# Patient Record
Sex: MALE | Race: WHITE | NOT HISPANIC OR LATINO | Employment: OTHER | URBAN - METROPOLITAN AREA
[De-identification: names, ages, dates, MRNs, and addresses within clinical notes are randomized per-mention and may not be internally consistent; named-entity substitution may affect disease eponyms.]

---

## 2018-01-16 ENCOUNTER — OFFICE VISIT (OUTPATIENT)
Dept: CARDIOLOGY | Facility: CLINIC | Age: 78
End: 2018-01-16
Attending: INTERNAL MEDICINE
Payer: MEDICARE

## 2018-01-16 ENCOUNTER — CLINICAL SUPPORT (OUTPATIENT)
Dept: CARDIOLOGY | Facility: CLINIC | Age: 78
End: 2018-01-16
Payer: MEDICARE

## 2018-01-16 VITALS
HEIGHT: 70 IN | DIASTOLIC BLOOD PRESSURE: 90 MMHG | HEART RATE: 80 BPM | SYSTOLIC BLOOD PRESSURE: 136 MMHG | WEIGHT: 200 LBS | BODY MASS INDEX: 28.63 KG/M2

## 2018-01-16 DIAGNOSIS — I35.1 NONRHEUMATIC AORTIC VALVE INSUFFICIENCY: Primary | ICD-10-CM

## 2018-01-16 DIAGNOSIS — I25.10 CORONARY ARTERY DISEASE INVOLVING NATIVE CORONARY ARTERY OF NATIVE HEART WITHOUT ANGINA PECTORIS: ICD-10-CM

## 2018-01-16 DIAGNOSIS — I10 ESSENTIAL HYPERTENSION: Primary | ICD-10-CM

## 2018-01-16 DIAGNOSIS — I35.1 NONRHEUMATIC AORTIC VALVE INSUFFICIENCY: ICD-10-CM

## 2018-01-16 DIAGNOSIS — I10 ESSENTIAL HYPERTENSION: ICD-10-CM

## 2018-01-16 DIAGNOSIS — I35.0 NONRHEUMATIC AORTIC VALVE STENOSIS: ICD-10-CM

## 2018-01-16 DIAGNOSIS — I35.1 NONRHEUMATIC AORTIC (VALVE) INSUFFICIENCY: ICD-10-CM

## 2018-01-16 PROCEDURE — 99213 OFFICE O/P EST LOW 20 MIN: CPT | Mod: S$GLB,,, | Performed by: INTERNAL MEDICINE

## 2018-01-16 PROCEDURE — 93306 TTE W/DOPPLER COMPLETE: CPT | Mod: S$GLB,,, | Performed by: INTERNAL MEDICINE

## 2018-01-19 PROBLEM — I35.0 NONRHEUMATIC AORTIC VALVE STENOSIS: Status: ACTIVE | Noted: 2018-01-19

## 2018-01-19 NOTE — PROGRESS NOTES
Subjective:    Patient ID:  Miguel Sawant Jr. is a 77 y.o. male     HPI  Here for F/U of Aortic valve disease, HBP    I feel great. Active. Recent trip to [place].    Current Outpatient Prescriptions   Medication Sig    aspirin (ECOTRIN) 81 MG EC tablet Take 81 mg by mouth once daily.      atorvastatin (LIPITOR) 20 MG tablet TAKE 1 TABLET DAILY    glucosamine-chondroitin (COSAMIN DS) 500-400 mg tablet Take 1 tablet by mouth 3 (three) times daily.      hydrochlorothiazide (HYDRODIURIL) 25 MG tablet TAKE 1 TABLET THREE TIMES A WEEK WITH ORANGE JUICE    hydrocodone-acetaminophen 5-325mg (NORCO) 5-325 mg per tablet Take 1 tablet by mouth every 4 (four) hours as needed for Pain.    MULTIVITAMIN W-MINERALS/LUTEIN (CENTRUM SILVER ORAL) Take by mouth once daily.      ondansetron (ZOFRAN-ODT) 4 MG TbDL Take 2 tablets (8 mg total) by mouth every 8 (eight) hours as needed.    oxycodone-acetaminophen (PERCOCET)  mg per tablet Take 1 tablet by mouth every 4 to 6 hours as needed.    ramipril (ALTACE) 10 MG capsule TAKE 1 CAPSULE DAILY    TOPROL XL 25 mg 24 hr tablet TAKE 1 TABLET DAILY     No current facility-administered medications for this visit.          Review of Systems   Constitution: Negative for chills, decreased appetite, fever, weight gain and weight loss.   HENT: Negative for congestion, hearing loss and sore throat.    Eyes: Negative for blurred vision, double vision and visual disturbance.   Cardiovascular: Negative for chest pain, claudication, dyspnea on exertion, leg swelling, palpitations and syncope.   Respiratory: Negative for cough, hemoptysis, shortness of breath, sputum production and wheezing.    Endocrine: Negative for cold intolerance and heat intolerance.   Hematologic/Lymphatic: Negative for bleeding problem. Does not bruise/bleed easily.   Skin: Negative for color change, dry skin, flushing and itching.   Musculoskeletal: Negative for back pain, joint pain and myalgias.  "  Gastrointestinal: Negative for abdominal pain, anorexia, constipation, diarrhea, dysphagia, nausea and vomiting.        No bleeding per rectum   Genitourinary: Negative for dysuria, flank pain, frequency, hematuria and nocturia.   Neurological: Negative for dizziness, headaches, light-headedness, loss of balance, seizures and tremors.   Psychiatric/Behavioral: Negative for altered mental status and depression.         Vitals:    01/16/18 1318   BP: (!) 136/90   Pulse: 80   Weight: 90.7 kg (200 lb)   Height: 5' 10" (1.778 m)     Objective:    Physical Exam   Constitutional: He is oriented to person, place, and time. He appears well-developed and well-nourished.   HENT:   Head: Normocephalic and atraumatic.   Right Ear: External ear normal.   Left Ear: External ear normal.   Nose: Nose normal.   Eyes: Conjunctivae and EOM are normal. Pupils are equal, round, and reactive to light. No scleral icterus.   Neck: Normal range of motion. Neck supple. No JVD present. No tracheal deviation present. No thyromegaly present.   Cardiovascular: Normal rate and regular rhythm.  Exam reveals no gallop and no friction rub.    Murmur heard.  Basal ejection systolic murmur conducted to the carotids.   Pulmonary/Chest: Effort normal and breath sounds normal. No respiratory distress. He has no rales. He exhibits no tenderness.   Abdominal: Soft. Bowel sounds are normal. He exhibits no distension and no mass. There is no tenderness.   Musculoskeletal: Normal range of motion. He exhibits no edema or tenderness.   Lymphadenopathy:     He has no cervical adenopathy.   Neurological: He is alert and oriented to person, place, and time. He has normal reflexes. No cranial nerve deficit. Coordination normal.   Skin: Skin is warm and dry. No rash noted.   Psychiatric: He has a normal mood and affect. His behavior is normal.       Reviewed Echo    Assessment:       1. Nonrheumatic aortic valve insufficiency    2. Nonrheumatic aortic valve " stenosis    3. Essential hypertension    4. Coronary artery disease involving native coronary artery of native heart without angina pectoris         Plan:       Stable  Discussed Echo findings with patient.  Continue the same meds.

## 2018-01-22 RX ORDER — ATORVASTATIN CALCIUM 20 MG/1
TABLET, FILM COATED ORAL
Qty: 90 TABLET | Refills: 3 | Status: SHIPPED | OUTPATIENT
Start: 2018-01-22 | End: 2019-01-20 | Stop reason: SDUPTHER

## 2018-02-26 RX ORDER — HYDROCHLOROTHIAZIDE 25 MG/1
TABLET ORAL
Qty: 90 TABLET | Refills: 1 | Status: SHIPPED | OUTPATIENT
Start: 2018-02-26 | End: 2019-03-18 | Stop reason: SDUPTHER

## 2018-05-15 ENCOUNTER — OFFICE VISIT (OUTPATIENT)
Dept: CARDIOLOGY | Facility: CLINIC | Age: 78
End: 2018-05-15
Attending: INTERNAL MEDICINE
Payer: MEDICARE

## 2018-05-15 VITALS
DIASTOLIC BLOOD PRESSURE: 88 MMHG | BODY MASS INDEX: 28.49 KG/M2 | SYSTOLIC BLOOD PRESSURE: 136 MMHG | HEART RATE: 64 BPM | WEIGHT: 199 LBS | HEIGHT: 70 IN

## 2018-05-15 DIAGNOSIS — I35.1 NONRHEUMATIC AORTIC VALVE INSUFFICIENCY: ICD-10-CM

## 2018-05-15 DIAGNOSIS — I35.0 NONRHEUMATIC AORTIC VALVE STENOSIS: ICD-10-CM

## 2018-05-15 DIAGNOSIS — I25.10 CORONARY ARTERY DISEASE INVOLVING NATIVE CORONARY ARTERY OF NATIVE HEART WITHOUT ANGINA PECTORIS: ICD-10-CM

## 2018-05-15 DIAGNOSIS — I10 ESSENTIAL HYPERTENSION: Primary | ICD-10-CM

## 2018-05-15 PROCEDURE — 99213 OFFICE O/P EST LOW 20 MIN: CPT | Mod: S$GLB,,, | Performed by: INTERNAL MEDICINE

## 2018-05-15 NOTE — PROGRESS NOTES
Subjective:    Patient ID:  Miguel Sawant Jr. is a 78 y.o. male     HPI here for follow-up of essential hypertension, aortic stenosis with aortic incompetence, and coronary artery disease.    I feel well.  I go to the gym regularly, and [place]    Current Outpatient Prescriptions   Medication Sig    aspirin (ECOTRIN) 81 MG EC tablet Take 81 mg by mouth once daily.      atorvastatin (LIPITOR) 20 MG tablet TAKE 1 TABLET DAILY    glucosamine-chondroitin (COSAMIN DS) 500-400 mg tablet Take 1 tablet by mouth 3 (three) times daily.      hydroCHLOROthiazide (HYDRODIURIL) 25 MG tablet TAKE 1 TABLET THREE TIMES A WEEK WITH ORANGE JUICE    hydrocodone-acetaminophen 5-325mg (NORCO) 5-325 mg per tablet Take 1 tablet by mouth every 4 (four) hours as needed for Pain.    MULTIVITAMIN W-MINERALS/LUTEIN (CENTRUM SILVER ORAL) Take by mouth once daily.      ondansetron (ZOFRAN-ODT) 4 MG TbDL Take 2 tablets (8 mg total) by mouth every 8 (eight) hours as needed.    oxycodone-acetaminophen (PERCOCET)  mg per tablet Take 1 tablet by mouth every 4 to 6 hours as needed.    ramipril (ALTACE) 10 MG capsule TAKE 1 CAPSULE DAILY    TOPROL XL 25 mg 24 hr tablet TAKE 1 TABLET DAILY     No current facility-administered medications for this visit.          Review of Systems   Constitution: Negative for chills, decreased appetite, fever, weight gain and weight loss.   HENT: Negative for congestion, hearing loss and sore throat.    Eyes: Negative for blurred vision, double vision and visual disturbance.   Cardiovascular: Negative for chest pain, claudication, dyspnea on exertion, leg swelling, palpitations and syncope.   Respiratory: Negative for cough, hemoptysis, shortness of breath, sputum production and wheezing.    Endocrine: Negative for cold intolerance and heat intolerance.   Hematologic/Lymphatic: Negative for bleeding problem. Does not bruise/bleed easily.   Skin: Negative for color change, dry  "skin, flushing and itching.   Musculoskeletal: Negative for back pain, joint pain and myalgias.   Gastrointestinal: Negative for abdominal pain, anorexia, constipation, diarrhea, dysphagia, nausea and vomiting.        No bleeding per rectum   Genitourinary: Negative for dysuria, flank pain, frequency, hematuria and nocturia.   Neurological: Negative for dizziness, headaches, light-headedness, loss of balance, seizures and tremors.   Psychiatric/Behavioral: Negative for altered mental status and depression.         Vitals:    05/15/18 1441   BP: 136/88   Pulse: 64   Weight: 90.3 kg (199 lb)   Height: 5' 10" (1.778 m)     Objective:    Physical Exam   Constitutional: He is oriented to person, place, and time. He appears well-developed and well-nourished.   HENT:   Head: Normocephalic and atraumatic.   Right Ear: External ear normal.   Left Ear: External ear normal.   Nose: Nose normal.   Eyes: Conjunctivae and EOM are normal. Pupils are equal, round, and reactive to light. No scleral icterus.   Neck: Normal range of motion. Neck supple. No JVD present. No tracheal deviation present. No thyromegaly present.   Cardiovascular: Normal rate and regular rhythm.  Exam reveals no gallop and no friction rub.    Murmur heard.  Basal ejection systolic murmur.  Early diastolic murmur.   Pulmonary/Chest: Effort normal and breath sounds normal. No respiratory distress. He has no rales. He exhibits no tenderness.   Abdominal: Soft. Bowel sounds are normal. He exhibits no distension and no mass. There is no tenderness.   Musculoskeletal: Normal range of motion. He exhibits no edema or tenderness.   Lymphadenopathy:     He has no cervical adenopathy.   Neurological: He is alert and oriented to person, place, and time. He has normal reflexes. No cranial nerve deficit. Coordination normal.   Skin: Skin is warm and dry. No rash noted.   Psychiatric: He has a normal mood and affect. His behavior is normal.         Assessment:       1. " Essential hypertension    2. Nonrheumatic aortic valve insufficiency    3. Coronary artery disease involving native coronary artery of native heart without angina pectoris    4. Nonrheumatic aortic valve stenosis         Plan:       Stable.    Continue the same regimen of medications.

## 2018-09-17 RX ORDER — METOPROLOL SUCCINATE 25 MG/1
TABLET, EXTENDED RELEASE ORAL
Qty: 90 TABLET | Refills: 3 | Status: SHIPPED | OUTPATIENT
Start: 2018-09-17 | End: 2019-08-22 | Stop reason: SDUPTHER

## 2018-09-17 RX ORDER — RAMIPRIL 10 MG/1
CAPSULE ORAL
Qty: 90 CAPSULE | Refills: 3 | Status: SHIPPED | OUTPATIENT
Start: 2018-09-17 | End: 2019-08-22 | Stop reason: SDUPTHER

## 2019-01-20 DIAGNOSIS — I25.10 CORONARY ARTERY DISEASE INVOLVING NATIVE CORONARY ARTERY OF NATIVE HEART WITHOUT ANGINA PECTORIS: Primary | ICD-10-CM

## 2019-01-21 RX ORDER — ATORVASTATIN CALCIUM 20 MG/1
TABLET, FILM COATED ORAL
Qty: 90 TABLET | Refills: 3 | Status: SHIPPED | OUTPATIENT
Start: 2019-01-21 | End: 2020-01-16

## 2019-03-18 DIAGNOSIS — I10 ESSENTIAL HYPERTENSION: Primary | ICD-10-CM

## 2019-03-18 RX ORDER — HYDROCHLOROTHIAZIDE 25 MG/1
TABLET ORAL
Qty: 90 TABLET | Refills: 1 | Status: SHIPPED | OUTPATIENT
Start: 2019-03-18 | End: 2020-02-17

## 2019-08-22 DIAGNOSIS — I10 ESSENTIAL HYPERTENSION: Primary | ICD-10-CM

## 2019-08-22 RX ORDER — METOPROLOL SUCCINATE 25 MG/1
TABLET, EXTENDED RELEASE ORAL
Qty: 90 TABLET | Refills: 4 | Status: SHIPPED | OUTPATIENT
Start: 2019-08-22 | End: 2020-10-27

## 2019-08-22 RX ORDER — RAMIPRIL 10 MG/1
CAPSULE ORAL
Qty: 90 CAPSULE | Refills: 4 | Status: SHIPPED | OUTPATIENT
Start: 2019-08-22 | End: 2020-10-27

## 2019-10-09 ENCOUNTER — OFFICE VISIT (OUTPATIENT)
Dept: CARDIOLOGY | Facility: CLINIC | Age: 79
End: 2019-10-09
Attending: INTERNAL MEDICINE
Payer: MEDICARE

## 2019-10-09 VITALS
HEART RATE: 79 BPM | DIASTOLIC BLOOD PRESSURE: 90 MMHG | HEIGHT: 70 IN | WEIGHT: 203 LBS | SYSTOLIC BLOOD PRESSURE: 139 MMHG | BODY MASS INDEX: 29.06 KG/M2

## 2019-10-09 DIAGNOSIS — I35.1 NONRHEUMATIC AORTIC VALVE INSUFFICIENCY: ICD-10-CM

## 2019-10-09 DIAGNOSIS — I35.1 NONRHEUMATIC AORTIC VALVE INSUFFICIENCY: Primary | ICD-10-CM

## 2019-10-09 DIAGNOSIS — I10 ESSENTIAL HYPERTENSION: Primary | ICD-10-CM

## 2019-10-09 DIAGNOSIS — I35.0 NONRHEUMATIC AORTIC VALVE STENOSIS: ICD-10-CM

## 2019-10-09 DIAGNOSIS — I25.10 CORONARY ARTERY DISEASE INVOLVING NATIVE CORONARY ARTERY OF NATIVE HEART WITHOUT ANGINA PECTORIS: ICD-10-CM

## 2019-10-09 PROCEDURE — 99213 OFFICE O/P EST LOW 20 MIN: CPT | Mod: S$GLB,,, | Performed by: INTERNAL MEDICINE

## 2019-10-09 PROCEDURE — 99213 PR OFFICE/OUTPT VISIT, EST, LEVL III, 20-29 MIN: ICD-10-PCS | Mod: S$GLB,,, | Performed by: INTERNAL MEDICINE

## 2019-10-25 NOTE — PROGRESS NOTES
Subjective:    Patient ID:  Miguel Sawant Jr. is a 79 y.o. male     HPI   Here for follow-up of hypertension, aortic valve disease, non flow restrictive coronary artery disease.    I feel well, I work out pretty regularly, and I have no complaints.    Current Outpatient Medications   Medication Sig    aspirin (ECOTRIN) 81 MG EC tablet Take 81 mg by mouth once daily.      atorvastatin (LIPITOR) 20 MG tablet TAKE 1 TABLET DAILY    glucosamine-chondroitin (COSAMIN DS) 500-400 mg tablet Take 1 tablet by mouth 3 (three) times daily.      hydroCHLOROthiazide (HYDRODIURIL) 25 MG tablet TAKE 1 TABLET THREE TIMES A WEEK WITH ORANGE JUICE    hydrocodone-acetaminophen 5-325mg (NORCO) 5-325 mg per tablet Take 1 tablet by mouth every 4 (four) hours as needed for Pain.    MULTIVITAMIN W-MINERALS/LUTEIN (CENTRUM SILVER ORAL) Take by mouth once daily.      ondansetron (ZOFRAN-ODT) 4 MG TbDL Take 2 tablets (8 mg total) by mouth every 8 (eight) hours as needed.    oxycodone-acetaminophen (PERCOCET)  mg per tablet Take 1 tablet by mouth every 4 to 6 hours as needed.    ramipril (ALTACE) 10 MG capsule TAKE 1 CAPSULE DAILY    TOPROL XL 25 mg 24 hr tablet TAKE 1 TABLET DAILY     No current facility-administered medications for this visit.          Review of Systems   Constitution: Negative for chills, decreased appetite, fever, weight gain and weight loss.   HENT: Negative for congestion, hearing loss and sore throat.    Eyes: Negative for blurred vision, double vision and visual disturbance.   Cardiovascular: Negative for chest pain, claudication, dyspnea on exertion, leg swelling, palpitations and syncope.   Respiratory: Negative for cough, hemoptysis, shortness of breath, sputum production and wheezing.    Endocrine: Negative for cold intolerance and heat intolerance.   Hematologic/Lymphatic: Negative for bleeding problem. Does not bruise/bleed easily.   Skin: Negative for color change, dry skin, flushing and  "itching.   Musculoskeletal: Negative for back pain, joint pain and myalgias.   Gastrointestinal: Negative for abdominal pain, anorexia, constipation, diarrhea, dysphagia, nausea and vomiting.        No bleeding per rectum   Genitourinary: Negative for dysuria, flank pain, frequency, hematuria and nocturia.   Neurological: Negative for dizziness, headaches, light-headedness, loss of balance, seizures and tremors.   Psychiatric/Behavioral: Negative for altered mental status and depression.         Vitals:    10/09/19 1412   BP: (!) 139/90   Pulse: 79   Weight: 92.1 kg (203 lb)   Height: 5' 10" (1.778 m)    Blood pressure recheck 132/76.    Objective:    Physical Exam   Constitutional: He is oriented to person, place, and time. He appears well-developed and well-nourished.   HENT:   Head: Normocephalic and atraumatic.   Right Ear: External ear normal.   Left Ear: External ear normal.   Nose: Nose normal.   Eyes: Pupils are equal, round, and reactive to light. Conjunctivae and EOM are normal. No scleral icterus.   Neck: Normal range of motion. Neck supple. No JVD present. No tracheal deviation present. No thyromegaly present.   Cardiovascular: Normal rate and regular rhythm. Exam reveals no gallop and no friction rub.   Murmur heard.  2/6 basal ejection systolic murmur conducted to the carotids.  Soft early diastolic murmur.   Pulmonary/Chest: Effort normal and breath sounds normal. No respiratory distress. He has no rales. He exhibits no tenderness.   Abdominal: Soft. Bowel sounds are normal. He exhibits no distension and no mass. There is no tenderness.   Musculoskeletal: Normal range of motion. He exhibits no edema or tenderness.   Lymphadenopathy:     He has no cervical adenopathy.   Neurological: He is alert and oriented to person, place, and time. He has normal reflexes. No cranial nerve deficit. Coordination normal.   Skin: Skin is warm and dry. No rash noted.   Psychiatric: He has a normal mood and affect. His " behavior is normal.         Assessment:       1. Essential hypertension    2. Nonrheumatic aortic valve insufficiency    3. Nonrheumatic aortic valve stenosis    4. Coronary artery disease involving native coronary artery of native heart without angina pectoris         Plan:         Stable.  Continue present pharmacological regimen.

## 2020-01-16 DIAGNOSIS — I25.10 CORONARY ARTERY DISEASE INVOLVING NATIVE CORONARY ARTERY OF NATIVE HEART WITHOUT ANGINA PECTORIS: ICD-10-CM

## 2020-01-16 RX ORDER — ATORVASTATIN CALCIUM 20 MG/1
TABLET, FILM COATED ORAL
Qty: 90 TABLET | Refills: 4 | Status: SHIPPED | OUTPATIENT
Start: 2020-01-16

## 2020-02-17 DIAGNOSIS — I10 ESSENTIAL HYPERTENSION: ICD-10-CM

## 2020-02-17 RX ORDER — HYDROCHLOROTHIAZIDE 25 MG/1
TABLET ORAL
Qty: 90 TABLET | Refills: 4 | Status: SHIPPED | OUTPATIENT
Start: 2020-02-17 | End: 2023-08-25

## 2020-04-21 ENCOUNTER — CLINICAL SUPPORT (OUTPATIENT)
Dept: CARDIOLOGY | Facility: CLINIC | Age: 80
End: 2020-04-21
Attending: INTERNAL MEDICINE
Payer: MEDICARE

## 2020-04-21 VITALS
DIASTOLIC BLOOD PRESSURE: 100 MMHG | WEIGHT: 206 LBS | BODY MASS INDEX: 29.49 KG/M2 | HEIGHT: 70 IN | SYSTOLIC BLOOD PRESSURE: 168 MMHG | HEART RATE: 62 BPM

## 2020-04-21 DIAGNOSIS — I35.1 NONRHEUMATIC AORTIC VALVE INSUFFICIENCY: ICD-10-CM

## 2020-04-21 DIAGNOSIS — I35.0 NONRHEUMATIC AORTIC VALVE STENOSIS: Primary | ICD-10-CM

## 2020-04-21 DIAGNOSIS — I25.10 CORONARY ARTERY DISEASE INVOLVING NATIVE CORONARY ARTERY OF NATIVE HEART WITHOUT ANGINA PECTORIS: ICD-10-CM

## 2020-04-21 DIAGNOSIS — I10 ESSENTIAL HYPERTENSION: ICD-10-CM

## 2020-04-21 LAB
AORTIC ROOT ANNULUS: 3.24 CM
AORTIC VALVE CUSP SEPERATION: 1.52 CM
AV INDEX (PROSTH): 0.3
AV MEAN GRADIENT: 28 MMHG
AV PEAK GRADIENT: 43 MMHG
AV REGURGITATION PRESSURE HALF TIME: 613 MS
AV VALVE AREA: 1.29 CM2
AV VELOCITY RATIO: 0.28
CV ECHO LV RWT: 0.57 CM
DOP CALC AO PEAK VEL: 3.27 M/S
DOP CALC AO VTI: 78.81 CM
DOP CALC LVOT AREA: 4.3 CM2
DOP CALC LVOT DIAMETER: 2.33 CM
DOP CALC LVOT PEAK VEL: 0.91 M/S
DOP CALC LVOT STROKE VOLUME: 101.68 CM3
DOP CALCLVOT PEAK VEL VTI: 23.86 CM
E WAVE DECELERATION TIME: 229.64 MSEC
E/A RATIO: 0.68
ECHO EF ESTIMATED: 65 %
ECHO LV POSTERIOR WALL: 1.31 CM (ref 0.6–1.1)
FRACTIONAL SHORTENING: 36 % (ref 28–44)
INTERVENTRICULAR SEPTUM: 1.31 CM (ref 0.6–1.1)
IVC PROX: 1.6 CM
IVRT: 114.18 MSEC
LEFT ATRIUM SIZE: 4.77 CM
LEFT INTERNAL DIMENSION IN SYSTOLE: 2.94 CM (ref 2.1–4)
LEFT VENTRICLE DIASTOLIC VOLUME: 95.48 ML
LEFT VENTRICLE SYSTOLIC VOLUME: 33.24 ML
LEFT VENTRICULAR INTERNAL DIMENSION IN DIASTOLE: 4.56 CM (ref 3.5–6)
LEFT VENTRICULAR MASS: 229.67 G
LV LATERAL E/E' RATIO: 0.05 M/S
MV A" WAVE DURATION": 198 MSEC
MV PEAK A VEL: 0.79 M/S
MV PEAK E VEL: 0.54 M/S
PISA TR MAX VEL: 2.92 M/S
PULM VEIN A" WAVE DURATION": 110 MSEC
PULM VEIN S/D RATIO: 1.23
PV PEAK D VEL: 0.39 M/S
PV PEAK S VEL: 0.48 M/S
PV PEAK VELOCITY: 1.12 CM/S
RA PRESSURE: 3 MMHG
RIGHT VENTRICULAR END-DIASTOLIC DIMENSION: 3.62 CM
TDI LATERAL: 12 M/S
TR MAX PG: 34 MMHG
TRICUSPID ANNULAR PLANE SYSTOLIC EXCURSION: 1.99 CM
TRICUSPID VALVE PEAK A WAVE VELOCITY: 0.73 M/S
TV PEAK E VEL: 0.67 M/S
TV REST PULMONARY ARTERY PRESSURE: 37 MMHG

## 2020-04-21 PROCEDURE — 99214 OFFICE O/P EST MOD 30 MIN: CPT | Mod: S$GLB,,, | Performed by: INTERNAL MEDICINE

## 2020-04-21 PROCEDURE — 99214 PR OFFICE/OUTPT VISIT, EST, LEVL IV, 30-39 MIN: ICD-10-PCS | Mod: S$GLB,,, | Performed by: INTERNAL MEDICINE

## 2020-04-21 NOTE — PROGRESS NOTES
Subjective:    Patient ID:  Miguel Sawant Jr. is a 80 y.o. male     HPI  Here for follow-up of hypertension, aortic valve disease,(last echocardiogram in January 2018 showed an aortic valve sq area of 1.5 sq cm.),  non flow restrictive coronary artery disease.    I feel well.  [place] yd work.    Current Outpatient Medications   Medication Sig    aspirin (ECOTRIN) 81 MG EC tablet Take 81 mg by mouth once daily.      atorvastatin (LIPITOR) 20 MG tablet TAKE 1 TABLET DAILY    glucosamine-chondroitin (COSAMIN DS) 500-400 mg tablet Take 1 tablet by mouth 3 (three) times daily.      hydroCHLOROthiazide (HYDRODIURIL) 25 MG tablet TAKE 1 TABLET THREE TIMES A WEEK WITH ORANGE JUICE    hydrocodone-acetaminophen 5-325mg (NORCO) 5-325 mg per tablet Take 1 tablet by mouth every 4 (four) hours as needed for Pain.    MULTIVITAMIN W-MINERALS/LUTEIN (CENTRUM SILVER ORAL) Take by mouth once daily.      ondansetron (ZOFRAN-ODT) 4 MG TbDL Take 2 tablets (8 mg total) by mouth every 8 (eight) hours as needed.    oxycodone-acetaminophen (PERCOCET)  mg per tablet Take 1 tablet by mouth every 4 to 6 hours as needed.    ramipril (ALTACE) 10 MG capsule TAKE 1 CAPSULE DAILY    TOPROL XL 25 mg 24 hr tablet TAKE 1 TABLET DAILY     No current facility-administered medications for this visit.           Review of Systems   Constitution: Negative for chills, decreased appetite, fever, weight gain and weight loss.   HENT: Negative for congestion, hearing loss and sore throat.    Eyes: Negative for blurred vision, double vision and visual disturbance.   Cardiovascular: Negative for chest pain, claudication, dyspnea on exertion, leg swelling, palpitations and syncope.   Respiratory: Negative for cough, hemoptysis, shortness of breath, sputum production and wheezing.    Endocrine: Negative for cold intolerance and heat intolerance.   Hematologic/Lymphatic: Negative for bleeding problem. Does not  "bruise/bleed easily.   Skin: Negative for color change, dry skin, flushing and itching.   Musculoskeletal: Negative for back pain, joint pain and myalgias.   Gastrointestinal: Negative for abdominal pain, anorexia, constipation, diarrhea, dysphagia, nausea and vomiting.        No bleeding per rectum   Genitourinary: Negative for dysuria, flank pain, frequency, hematuria and nocturia.   Neurological: Negative for dizziness, headaches, light-headedness, loss of balance, seizures and tremors.   Psychiatric/Behavioral: Negative for altered mental status and depression.         Vitals:    04/21/20 1110   BP: (!) 168/100   Pulse: 62   Weight: 93.4 kg (206 lb)   Height: 5' 10" (1.778 m)    Blood pressure recheck 138/76.      Objective:    Physical Exam   Constitutional: He is oriented to person, place, and time. He appears well-developed and well-nourished.   HENT:   Head: Normocephalic and atraumatic.   Right Ear: External ear normal.   Left Ear: External ear normal.   Nose: Nose normal.   Eyes: Pupils are equal, round, and reactive to light. Conjunctivae and EOM are normal. No scleral icterus.   Neck: Normal range of motion. Neck supple. No JVD present. No tracheal deviation present. No thyromegaly present.   Cardiovascular: Normal rate and regular rhythm. Exam reveals no gallop and no friction rub.   Murmur heard.  Basal ejection systolic murmur conducted to the carotids.   Pulmonary/Chest: Effort normal and breath sounds normal. No respiratory distress. He has no rales. He exhibits no tenderness.   Abdominal: Soft. Bowel sounds are normal. He exhibits no distension and no mass. There is no tenderness.   Musculoskeletal: Normal range of motion. He exhibits no edema or tenderness.   Lymphadenopathy:     He has no cervical adenopathy.   Neurological: He is alert and oriented to person, place, and time. He has normal reflexes. No cranial nerve deficit. Coordination normal.   Skin: Skin is warm and dry. No rash noted. "   Psychiatric: He has a normal mood and affect. His behavior is normal.       echocardiogram reviewed.  Estimated aortic valve sq area is 1.3 sq cm.    Assessment:       1. Nonrheumatic aortic valve stenosis    2. Nonrheumatic aortic valve insufficiency    3. Essential hypertension    4. Coronary artery disease involving native coronary artery of native heart without angina pectoris         Plan:       Discussed echocardiogram findings with patient.  Continue present pharmacological regimen.  Monitor blood pressure at home.

## 2021-01-22 ENCOUNTER — PATIENT MESSAGE (OUTPATIENT)
Dept: ADMINISTRATIVE | Facility: OTHER | Age: 81
End: 2021-01-22

## 2021-02-10 ENCOUNTER — IMMUNIZATION (OUTPATIENT)
Dept: PHARMACY | Facility: CLINIC | Age: 81
End: 2021-02-10
Payer: MEDICARE

## 2021-02-10 DIAGNOSIS — Z23 NEED FOR VACCINATION: Primary | ICD-10-CM

## 2021-03-10 ENCOUNTER — IMMUNIZATION (OUTPATIENT)
Dept: PHARMACY | Facility: CLINIC | Age: 81
End: 2021-03-10
Payer: MEDICARE

## 2021-03-10 DIAGNOSIS — Z23 NEED FOR VACCINATION: Primary | ICD-10-CM

## 2021-07-26 ENCOUNTER — HOSPITAL ENCOUNTER (OUTPATIENT)
Dept: PREADMISSION TESTING | Facility: OTHER | Age: 81
Discharge: HOME | End: 2021-07-26
Attending: INTERNAL MEDICINE
Payer: MEDICARE

## 2021-07-26 VITALS
SYSTOLIC BLOOD PRESSURE: 168 MMHG | HEIGHT: 71 IN | TEMPERATURE: 98 F | DIASTOLIC BLOOD PRESSURE: 84 MMHG | OXYGEN SATURATION: 97 % | BODY MASS INDEX: 28 KG/M2 | HEART RATE: 75 BPM | WEIGHT: 200 LBS

## 2021-07-26 DIAGNOSIS — I35.1 AORTIC VALVE INSUFFICIENCY, ETIOLOGY OF CARDIAC VALVE DISEASE UNSPECIFIED: Primary | ICD-10-CM

## 2021-07-26 LAB
ANION GAP SERPL CALC-SCNC: 8 MMOL/L (ref 8–16)
BASOPHILS # BLD AUTO: 0.04 K/UL (ref 0–0.2)
BASOPHILS NFR BLD: 0.5 % (ref 0–1.9)
BUN SERPL-MCNC: 15 MG/DL (ref 8–23)
CALCIUM SERPL-MCNC: 9.5 MG/DL (ref 8.7–10.5)
CHLORIDE SERPL-SCNC: 104 MMOL/L (ref 95–110)
CO2 SERPL-SCNC: 25 MMOL/L (ref 23–29)
CREAT SERPL-MCNC: 0.9 MG/DL (ref 0.5–1.4)
DIFFERENTIAL METHOD: ABNORMAL
EOSINOPHIL # BLD AUTO: 0.2 K/UL (ref 0–0.5)
EOSINOPHIL NFR BLD: 2 % (ref 0–8)
ERYTHROCYTE [DISTWIDTH] IN BLOOD BY AUTOMATED COUNT: 13 % (ref 11.5–14.5)
EST. GFR  (AFRICAN AMERICAN): >60 ML/MIN/1.73 M^2
EST. GFR  (NON AFRICAN AMERICAN): >60 ML/MIN/1.73 M^2
GLUCOSE SERPL-MCNC: 107 MG/DL (ref 70–110)
HCT VFR BLD AUTO: 44 % (ref 40–54)
HGB BLD-MCNC: 15.1 G/DL (ref 14–18)
IMM GRANULOCYTES # BLD AUTO: 0.04 K/UL (ref 0–0.04)
IMM GRANULOCYTES NFR BLD AUTO: 0.5 % (ref 0–0.5)
LYMPHOCYTES # BLD AUTO: 1.5 K/UL (ref 1–4.8)
LYMPHOCYTES NFR BLD: 18 % (ref 18–48)
MCH RBC QN AUTO: 34.6 PG (ref 27–31)
MCHC RBC AUTO-ENTMCNC: 34.3 G/DL (ref 32–36)
MCV RBC AUTO: 101 FL (ref 82–98)
MONOCYTES # BLD AUTO: 0.8 K/UL (ref 0.3–1)
MONOCYTES NFR BLD: 9.2 % (ref 4–15)
NEUTROPHILS # BLD AUTO: 5.8 K/UL (ref 1.8–7.7)
NEUTROPHILS NFR BLD: 69.8 % (ref 38–73)
NRBC BLD-RTO: 0 /100 WBC
PLATELET # BLD AUTO: 205 K/UL (ref 150–450)
PMV BLD AUTO: 11.6 FL (ref 9.2–12.9)
POTASSIUM SERPL-SCNC: 4.3 MMOL/L (ref 3.5–5.1)
RBC # BLD AUTO: 4.36 M/UL (ref 4.6–6.2)
SODIUM SERPL-SCNC: 137 MMOL/L (ref 136–145)
WBC # BLD AUTO: 8.3 K/UL (ref 3.9–12.7)

## 2021-07-26 PROCEDURE — 85025 COMPLETE CBC W/AUTO DIFF WBC: CPT | Performed by: INTERNAL MEDICINE

## 2021-07-26 PROCEDURE — 36415 COLL VENOUS BLD VENIPUNCTURE: CPT | Performed by: INTERNAL MEDICINE

## 2021-07-26 PROCEDURE — 80048 BASIC METABOLIC PNL TOTAL CA: CPT | Performed by: INTERNAL MEDICINE

## 2021-07-30 ENCOUNTER — HOSPITAL ENCOUNTER (OUTPATIENT)
Facility: OTHER | Age: 81
Discharge: HOME | End: 2021-07-30
Attending: INTERNAL MEDICINE | Admitting: INTERNAL MEDICINE
Payer: MEDICARE

## 2021-07-30 VITALS
RESPIRATION RATE: 15 BRPM | HEART RATE: 63 BPM | SYSTOLIC BLOOD PRESSURE: 132 MMHG | OXYGEN SATURATION: 95 % | DIASTOLIC BLOOD PRESSURE: 83 MMHG | WEIGHT: 200 LBS | BODY MASS INDEX: 27.89 KG/M2 | TEMPERATURE: 98 F

## 2021-07-30 DIAGNOSIS — I35.0 AORTIC VALVE STENOSIS: ICD-10-CM

## 2021-07-30 DIAGNOSIS — I35.0 NONRHEUMATIC AORTIC VALVE STENOSIS: Primary | ICD-10-CM

## 2021-07-30 DIAGNOSIS — I35.0 AORTIC VALVE STENOSIS, ETIOLOGY OF CARDIAC VALVE DISEASE UNSPECIFIED: ICD-10-CM

## 2021-07-30 LAB — CATH EF QUANTITATIVE: 60 %

## 2021-07-30 PROCEDURE — 25000003 PHARM REV CODE 250: Performed by: INTERNAL MEDICINE

## 2021-07-30 PROCEDURE — 93460 R&L HRT ART/VENTRICLE ANGIO: CPT | Performed by: INTERNAL MEDICINE

## 2021-07-30 PROCEDURE — C1769 GUIDE WIRE: HCPCS | Performed by: INTERNAL MEDICINE

## 2021-07-30 PROCEDURE — 99152 MOD SED SAME PHYS/QHP 5/>YRS: CPT | Performed by: INTERNAL MEDICINE

## 2021-07-30 PROCEDURE — C1894 INTRO/SHEATH, NON-LASER: HCPCS | Performed by: INTERNAL MEDICINE

## 2021-07-30 PROCEDURE — 99153 MOD SED SAME PHYS/QHP EA: CPT | Performed by: INTERNAL MEDICINE

## 2021-07-30 PROCEDURE — 25500020 PHARM REV CODE 255: Performed by: INTERNAL MEDICINE

## 2021-07-30 PROCEDURE — 63600175 PHARM REV CODE 636 W HCPCS: Performed by: INTERNAL MEDICINE

## 2021-07-30 PROCEDURE — C1751 CATH, INF, PER/CENT/MIDLINE: HCPCS | Performed by: INTERNAL MEDICINE

## 2021-07-30 RX ORDER — ACETAMINOPHEN 325 MG/1
650 TABLET ORAL EVERY 4 HOURS PRN
Status: DISCONTINUED | OUTPATIENT
Start: 2021-07-30 | End: 2021-07-31 | Stop reason: HOSPADM

## 2021-07-30 RX ORDER — SODIUM CHLORIDE 9 MG/ML
INJECTION, SOLUTION INTRAVENOUS CONTINUOUS
Status: ACTIVE | OUTPATIENT
Start: 2021-07-30 | End: 2021-07-30

## 2021-07-30 RX ORDER — NITROGLYCERIN 0.4 MG/1
0.4 TABLET SUBLINGUAL EVERY 5 MIN PRN
Status: DISCONTINUED | OUTPATIENT
Start: 2021-07-30 | End: 2021-07-31 | Stop reason: HOSPADM

## 2021-07-30 RX ORDER — HYDROCODONE BITARTRATE AND ACETAMINOPHEN 5; 325 MG/1; MG/1
1 TABLET ORAL EVERY 4 HOURS PRN
Status: DISCONTINUED | OUTPATIENT
Start: 2021-07-30 | End: 2021-07-31 | Stop reason: HOSPADM

## 2021-07-30 RX ORDER — SODIUM CHLORIDE 9 MG/ML
INJECTION, SOLUTION INTRAVENOUS CONTINUOUS
Status: DISCONTINUED | OUTPATIENT
Start: 2021-07-30 | End: 2021-07-30 | Stop reason: HOSPADM

## 2021-07-30 RX ORDER — FENTANYL CITRATE 50 UG/ML
INJECTION, SOLUTION INTRAMUSCULAR; INTRAVENOUS
Status: DISCONTINUED | OUTPATIENT
Start: 2021-07-30 | End: 2021-07-31 | Stop reason: HOSPADM

## 2021-07-30 RX ORDER — NAPROXEN SODIUM 220 MG/1
162 TABLET, FILM COATED ORAL
Status: DISCONTINUED | OUTPATIENT
Start: 2021-07-30 | End: 2021-07-30 | Stop reason: HOSPADM

## 2021-07-30 RX ORDER — DIPHENHYDRAMINE HYDROCHLORIDE 50 MG/ML
25 INJECTION INTRAMUSCULAR; INTRAVENOUS EVERY 6 HOURS PRN
Status: DISCONTINUED | OUTPATIENT
Start: 2021-07-30 | End: 2021-07-31 | Stop reason: HOSPADM

## 2021-07-30 RX ORDER — DIAZEPAM 5 MG/1
5 TABLET ORAL
Status: COMPLETED | OUTPATIENT
Start: 2021-07-30 | End: 2021-07-30

## 2021-07-30 RX ORDER — HYDROCODONE BITARTRATE AND ACETAMINOPHEN 10; 325 MG/1; MG/1
1 TABLET ORAL EVERY 4 HOURS PRN
Status: DISCONTINUED | OUTPATIENT
Start: 2021-07-30 | End: 2021-07-31 | Stop reason: HOSPADM

## 2021-07-30 RX ORDER — LIDOCAINE HYDROCHLORIDE 10 MG/ML
INJECTION, SOLUTION EPIDURAL; INFILTRATION; INTRACAUDAL; PERINEURAL
Status: DISCONTINUED | OUTPATIENT
Start: 2021-07-30 | End: 2021-07-31 | Stop reason: HOSPADM

## 2021-07-30 RX ORDER — ONDANSETRON 8 MG/1
8 TABLET, ORALLY DISINTEGRATING ORAL EVERY 8 HOURS PRN
Status: DISCONTINUED | OUTPATIENT
Start: 2021-07-30 | End: 2021-07-31 | Stop reason: HOSPADM

## 2021-07-30 RX ORDER — MAG HYDROX/ALUMINUM HYD/SIMETH 200-200-20
30 SUSPENSION, ORAL (FINAL DOSE FORM) ORAL
Status: DISCONTINUED | OUTPATIENT
Start: 2021-07-30 | End: 2021-07-31 | Stop reason: HOSPADM

## 2021-07-30 RX ORDER — DIPHENHYDRAMINE HCL 25 MG
25 CAPSULE ORAL
Status: COMPLETED | OUTPATIENT
Start: 2021-07-30 | End: 2021-07-30

## 2021-07-30 RX ORDER — MIDAZOLAM HYDROCHLORIDE 1 MG/ML
INJECTION INTRAMUSCULAR; INTRAVENOUS
Status: DISCONTINUED | OUTPATIENT
Start: 2021-07-30 | End: 2021-07-31 | Stop reason: HOSPADM

## 2021-07-30 RX ORDER — HEPARIN SOD,PORCINE/0.9 % NACL 1000/500ML
INTRAVENOUS SOLUTION INTRAVENOUS
Status: DISCONTINUED | OUTPATIENT
Start: 2021-07-30 | End: 2021-07-31 | Stop reason: HOSPADM

## 2021-07-30 RX ORDER — NAPROXEN SODIUM 220 MG/1
81 TABLET, FILM COATED ORAL
Status: DISCONTINUED | OUTPATIENT
Start: 2021-07-30 | End: 2021-07-30 | Stop reason: HOSPADM

## 2021-07-30 RX ADMIN — SODIUM CHLORIDE: 0.9 INJECTION, SOLUTION INTRAVENOUS at 02:07

## 2021-07-30 RX ADMIN — DIPHENHYDRAMINE HYDROCHLORIDE 25 MG: 25 CAPSULE ORAL at 06:07

## 2021-07-30 RX ADMIN — DIAZEPAM 5 MG: 5 TABLET ORAL at 06:07

## 2023-02-23 ENCOUNTER — OFFICE VISIT (OUTPATIENT)
Dept: INTERNAL MEDICINE | Facility: CLINIC | Age: 83
End: 2023-02-23
Payer: MEDICARE

## 2023-02-23 ENCOUNTER — LAB VISIT (OUTPATIENT)
Dept: LAB | Facility: HOSPITAL | Age: 83
End: 2023-02-23
Attending: STUDENT IN AN ORGANIZED HEALTH CARE EDUCATION/TRAINING PROGRAM
Payer: MEDICARE

## 2023-02-23 VITALS
SYSTOLIC BLOOD PRESSURE: 128 MMHG | BODY MASS INDEX: 28.98 KG/M2 | DIASTOLIC BLOOD PRESSURE: 90 MMHG | OXYGEN SATURATION: 97 % | HEIGHT: 71 IN | HEART RATE: 84 BPM | WEIGHT: 207 LBS

## 2023-02-23 DIAGNOSIS — I10 PRIMARY HYPERTENSION: ICD-10-CM

## 2023-02-23 DIAGNOSIS — I35.1 NONRHEUMATIC AORTIC VALVE INSUFFICIENCY: ICD-10-CM

## 2023-02-23 DIAGNOSIS — Z12.11 SCREENING FOR COLON CANCER: ICD-10-CM

## 2023-02-23 DIAGNOSIS — I25.10 CORONARY ARTERY DISEASE INVOLVING NATIVE CORONARY ARTERY OF NATIVE HEART WITHOUT ANGINA PECTORIS: ICD-10-CM

## 2023-02-23 DIAGNOSIS — Z79.899 OTHER LONG TERM (CURRENT) DRUG THERAPY: ICD-10-CM

## 2023-02-23 DIAGNOSIS — I35.0 AORTIC VALVE STENOSIS, ETIOLOGY OF CARDIAC VALVE DISEASE UNSPECIFIED: ICD-10-CM

## 2023-02-23 DIAGNOSIS — N40.0 BPH WITHOUT URINARY OBSTRUCTION: ICD-10-CM

## 2023-02-23 DIAGNOSIS — Z12.5 SCREENING PSA (PROSTATE SPECIFIC ANTIGEN): ICD-10-CM

## 2023-02-23 DIAGNOSIS — Z76.89 ESTABLISHING CARE WITH NEW DOCTOR, ENCOUNTER FOR: Primary | ICD-10-CM

## 2023-02-23 LAB
ALBUMIN SERPL BCP-MCNC: 3.8 G/DL (ref 3.5–5.2)
ALP SERPL-CCNC: 60 U/L (ref 55–135)
ALT SERPL W/O P-5'-P-CCNC: 35 U/L (ref 10–44)
ANION GAP SERPL CALC-SCNC: 8 MMOL/L (ref 8–16)
AST SERPL-CCNC: 36 U/L (ref 10–40)
BASOPHILS # BLD AUTO: 0.06 K/UL (ref 0–0.2)
BASOPHILS NFR BLD: 0.7 % (ref 0–1.9)
BILIRUB SERPL-MCNC: 0.9 MG/DL (ref 0.1–1)
BUN SERPL-MCNC: 12 MG/DL (ref 8–23)
CALCIUM SERPL-MCNC: 10.2 MG/DL (ref 8.7–10.5)
CHLORIDE SERPL-SCNC: 104 MMOL/L (ref 95–110)
CHOLEST SERPL-MCNC: 130 MG/DL (ref 120–199)
CHOLEST/HDLC SERPL: 2.5 {RATIO} (ref 2–5)
CO2 SERPL-SCNC: 26 MMOL/L (ref 23–29)
CREAT SERPL-MCNC: 1 MG/DL (ref 0.5–1.4)
DIFFERENTIAL METHOD: ABNORMAL
EOSINOPHIL # BLD AUTO: 0.1 K/UL (ref 0–0.5)
EOSINOPHIL NFR BLD: 1.5 % (ref 0–8)
ERYTHROCYTE [DISTWIDTH] IN BLOOD BY AUTOMATED COUNT: 13.1 % (ref 11.5–14.5)
EST. GFR  (NO RACE VARIABLE): >60 ML/MIN/1.73 M^2
ESTIMATED AVG GLUCOSE: 103 MG/DL (ref 68–131)
GLUCOSE SERPL-MCNC: 110 MG/DL (ref 70–110)
HBA1C MFR BLD: 5.2 % (ref 4–5.6)
HCT VFR BLD AUTO: 45.8 % (ref 40–54)
HDLC SERPL-MCNC: 53 MG/DL (ref 40–75)
HDLC SERPL: 40.8 % (ref 20–50)
HGB BLD-MCNC: 15.4 G/DL (ref 14–18)
IMM GRANULOCYTES # BLD AUTO: 0.04 K/UL (ref 0–0.04)
IMM GRANULOCYTES NFR BLD AUTO: 0.5 % (ref 0–0.5)
LDLC SERPL CALC-MCNC: 67.4 MG/DL (ref 63–159)
LYMPHOCYTES # BLD AUTO: 1.5 K/UL (ref 1–4.8)
LYMPHOCYTES NFR BLD: 16.7 % (ref 18–48)
MCH RBC QN AUTO: 34.3 PG (ref 27–31)
MCHC RBC AUTO-ENTMCNC: 33.6 G/DL (ref 32–36)
MCV RBC AUTO: 102 FL (ref 82–98)
MONOCYTES # BLD AUTO: 0.8 K/UL (ref 0.3–1)
MONOCYTES NFR BLD: 8.7 % (ref 4–15)
NEUTROPHILS # BLD AUTO: 6.3 K/UL (ref 1.8–7.7)
NEUTROPHILS NFR BLD: 71.9 % (ref 38–73)
NONHDLC SERPL-MCNC: 77 MG/DL
NRBC BLD-RTO: 0 /100 WBC
PLATELET # BLD AUTO: 202 K/UL (ref 150–450)
PMV BLD AUTO: 12 FL (ref 9.2–12.9)
POTASSIUM SERPL-SCNC: 4.7 MMOL/L (ref 3.5–5.1)
PROT SERPL-MCNC: 6.5 G/DL (ref 6–8.4)
RBC # BLD AUTO: 4.49 M/UL (ref 4.6–6.2)
SODIUM SERPL-SCNC: 138 MMOL/L (ref 136–145)
T4 FREE SERPL-MCNC: 0.82 NG/DL (ref 0.71–1.51)
TRIGL SERPL-MCNC: 48 MG/DL (ref 30–150)
TSH SERPL DL<=0.005 MIU/L-ACNC: 2.69 UIU/ML (ref 0.4–4)
WBC # BLD AUTO: 8.72 K/UL (ref 3.9–12.7)

## 2023-02-23 PROCEDURE — 84439 ASSAY OF FREE THYROXINE: CPT | Performed by: STUDENT IN AN ORGANIZED HEALTH CARE EDUCATION/TRAINING PROGRAM

## 2023-02-23 PROCEDURE — 84443 ASSAY THYROID STIM HORMONE: CPT | Performed by: STUDENT IN AN ORGANIZED HEALTH CARE EDUCATION/TRAINING PROGRAM

## 2023-02-23 PROCEDURE — 1160F PR REVIEW ALL MEDS BY PRESCRIBER/CLIN PHARMACIST DOCUMENTED: ICD-10-PCS | Mod: CPTII,S$GLB,, | Performed by: STUDENT IN AN ORGANIZED HEALTH CARE EDUCATION/TRAINING PROGRAM

## 2023-02-23 PROCEDURE — 80053 COMPREHEN METABOLIC PANEL: CPT | Performed by: STUDENT IN AN ORGANIZED HEALTH CARE EDUCATION/TRAINING PROGRAM

## 2023-02-23 PROCEDURE — 1160F RVW MEDS BY RX/DR IN RCRD: CPT | Mod: CPTII,S$GLB,, | Performed by: STUDENT IN AN ORGANIZED HEALTH CARE EDUCATION/TRAINING PROGRAM

## 2023-02-23 PROCEDURE — 3074F PR MOST RECENT SYSTOLIC BLOOD PRESSURE < 130 MM HG: ICD-10-PCS | Mod: CPTII,S$GLB,, | Performed by: STUDENT IN AN ORGANIZED HEALTH CARE EDUCATION/TRAINING PROGRAM

## 2023-02-23 PROCEDURE — 1126F AMNT PAIN NOTED NONE PRSNT: CPT | Mod: CPTII,S$GLB,, | Performed by: STUDENT IN AN ORGANIZED HEALTH CARE EDUCATION/TRAINING PROGRAM

## 2023-02-23 PROCEDURE — 1159F MED LIST DOCD IN RCRD: CPT | Mod: CPTII,S$GLB,, | Performed by: STUDENT IN AN ORGANIZED HEALTH CARE EDUCATION/TRAINING PROGRAM

## 2023-02-23 PROCEDURE — 99204 OFFICE O/P NEW MOD 45 MIN: CPT | Mod: S$GLB,,, | Performed by: STUDENT IN AN ORGANIZED HEALTH CARE EDUCATION/TRAINING PROGRAM

## 2023-02-23 PROCEDURE — 3074F SYST BP LT 130 MM HG: CPT | Mod: CPTII,S$GLB,, | Performed by: STUDENT IN AN ORGANIZED HEALTH CARE EDUCATION/TRAINING PROGRAM

## 2023-02-23 PROCEDURE — 84153 ASSAY OF PSA TOTAL: CPT | Performed by: STUDENT IN AN ORGANIZED HEALTH CARE EDUCATION/TRAINING PROGRAM

## 2023-02-23 PROCEDURE — 1159F PR MEDICATION LIST DOCUMENTED IN MEDICAL RECORD: ICD-10-PCS | Mod: CPTII,S$GLB,, | Performed by: STUDENT IN AN ORGANIZED HEALTH CARE EDUCATION/TRAINING PROGRAM

## 2023-02-23 PROCEDURE — 3080F DIAST BP >= 90 MM HG: CPT | Mod: CPTII,S$GLB,, | Performed by: STUDENT IN AN ORGANIZED HEALTH CARE EDUCATION/TRAINING PROGRAM

## 2023-02-23 PROCEDURE — 99204 PR OFFICE/OUTPT VISIT, NEW, LEVL IV, 45-59 MIN: ICD-10-PCS | Mod: S$GLB,,, | Performed by: STUDENT IN AN ORGANIZED HEALTH CARE EDUCATION/TRAINING PROGRAM

## 2023-02-23 PROCEDURE — 3288F PR FALLS RISK ASSESSMENT DOCUMENTED: ICD-10-PCS | Mod: CPTII,S$GLB,, | Performed by: STUDENT IN AN ORGANIZED HEALTH CARE EDUCATION/TRAINING PROGRAM

## 2023-02-23 PROCEDURE — 3288F FALL RISK ASSESSMENT DOCD: CPT | Mod: CPTII,S$GLB,, | Performed by: STUDENT IN AN ORGANIZED HEALTH CARE EDUCATION/TRAINING PROGRAM

## 2023-02-23 PROCEDURE — 99999 PR PBB SHADOW E&M-EST. PATIENT-LVL IV: CPT | Mod: PBBFAC,,, | Performed by: STUDENT IN AN ORGANIZED HEALTH CARE EDUCATION/TRAINING PROGRAM

## 2023-02-23 PROCEDURE — 85025 COMPLETE CBC W/AUTO DIFF WBC: CPT | Performed by: STUDENT IN AN ORGANIZED HEALTH CARE EDUCATION/TRAINING PROGRAM

## 2023-02-23 PROCEDURE — 1101F PR PT FALLS ASSESS DOC 0-1 FALLS W/OUT INJ PAST YR: ICD-10-PCS | Mod: CPTII,S$GLB,, | Performed by: STUDENT IN AN ORGANIZED HEALTH CARE EDUCATION/TRAINING PROGRAM

## 2023-02-23 PROCEDURE — 99999 PR PBB SHADOW E&M-EST. PATIENT-LVL IV: ICD-10-PCS | Mod: PBBFAC,,, | Performed by: STUDENT IN AN ORGANIZED HEALTH CARE EDUCATION/TRAINING PROGRAM

## 2023-02-23 PROCEDURE — 83036 HEMOGLOBIN GLYCOSYLATED A1C: CPT | Performed by: STUDENT IN AN ORGANIZED HEALTH CARE EDUCATION/TRAINING PROGRAM

## 2023-02-23 PROCEDURE — 1126F PR PAIN SEVERITY QUANTIFIED, NO PAIN PRESENT: ICD-10-PCS | Mod: CPTII,S$GLB,, | Performed by: STUDENT IN AN ORGANIZED HEALTH CARE EDUCATION/TRAINING PROGRAM

## 2023-02-23 PROCEDURE — 36415 COLL VENOUS BLD VENIPUNCTURE: CPT | Performed by: STUDENT IN AN ORGANIZED HEALTH CARE EDUCATION/TRAINING PROGRAM

## 2023-02-23 PROCEDURE — 3080F PR MOST RECENT DIASTOLIC BLOOD PRESSURE >= 90 MM HG: ICD-10-PCS | Mod: CPTII,S$GLB,, | Performed by: STUDENT IN AN ORGANIZED HEALTH CARE EDUCATION/TRAINING PROGRAM

## 2023-02-23 PROCEDURE — 1101F PT FALLS ASSESS-DOCD LE1/YR: CPT | Mod: CPTII,S$GLB,, | Performed by: STUDENT IN AN ORGANIZED HEALTH CARE EDUCATION/TRAINING PROGRAM

## 2023-02-23 PROCEDURE — 80061 LIPID PANEL: CPT | Performed by: STUDENT IN AN ORGANIZED HEALTH CARE EDUCATION/TRAINING PROGRAM

## 2023-02-23 NOTE — PATIENT INSTRUCTIONS
New PCP:   Earnest Sanford MD  Family Medicine  [facility]  [address]   [place] [id number]  Phone: [phone]

## 2023-02-23 NOTE — PROGRESS NOTES
SUBJECTIVE     Chief Complaint   Patient presents with    Establish Care       HPI  Miguel Sawant Jr. is a 82 y.o. male with  HTN, HLD, aortic stenosis, aortic insufficiency, CAD  that presents for establishment of care.     HTN:  Patient maintained on ramipril 10 mg daily, hydrochlorothiazide 25 mg daily, Toprol-XL 25 mg daily.    Aortic valve disease  Follows with Dr. Queen for cardiology  [facility] on 7/21/2021 showed normal left ventricular systolic function, no mitral valve regurgitation.  Left ventricular ejection fraction grossly normal around 60%.  Right heart pressures were normal.  Aortic valve noted to have moderate stenosis.  Mild mid LAD stenosis noted.  Patient on atorvastatin 20 mg daily, daily aspirin. Next visit on 3/1/23. Echo planned. Some BAINS. Cardiology aware.     Tobacco: Never  EtOH: Social  Recreational Drugs: None  Social narrative: , had been taking care of wife and stopped following up routinely with primary care due to this. 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   Activity: Trying to become active again. Swims laps.   Diet: Watching diet more closely now, had some dietary indiscretions during Carnival season.         HCM:   Patient last had a colonoscopy when he was 49. No further colonoscopies. No family history of colon cancer.     PAST MEDICAL HISTORY:  Past Medical History:   Diagnosis Date    Blood transfusion     Heart murmur     Hypertension        PAST SURGICAL HISTORY:  Past Surgical History:   Procedure Laterality Date    JOINT REPLACEMENT      left    KNEE ARTHROSCOPY      LEFT HEART CATHETERIZATION Right 7/30/2021    Procedure: CATHETERIZATION, HEART, LEFT LV MEGHNA POSSIBLE;  Surgeon: Paco Queen MD;  Location: [facility], [place], operated by [facility] CATH LAB;  Service: Cardiology;  Laterality: Right;    RIGHT HEART CATHETERIZATION Right 7/30/2021    Procedure: INSERTION, CATHETER, RIGHT HEART LV MEGHNA POSSIBLE;  Surgeon: Paco Queen MD;  Location: [facility], [place], operated by [facility]  CATH LAB;  Service: Cardiology;  Laterality: Right;    TONSILLECTOMY         FAMILY HISTORY:  Family History   Problem Relation Age of Onset    Cancer Father     COPD Father        ALLERGIES AND MEDICATIONS: updated and reviewed.  Review of patient's allergies indicates:  No Known Allergies  Current Outpatient Medications   Medication Sig Dispense Refill    aspirin (ECOTRIN) 81 MG EC tablet Take 81 mg by mouth once daily.        atorvastatin (LIPITOR) 20 MG tablet TAKE 1 TABLET DAILY 90 tablet 4    glucosamine-chondroitin (COSAMIN DS) 500-400 mg tablet Take 1 tablet by mouth 3 (three) times daily.        hydroCHLOROthiazide (HYDRODIURIL) 25 MG tablet TAKE 1 TABLET THREE TIMES A WEEK WITH ORANGE JUICE 90 tablet 4    metoprolol succinate (TOPROL-XL) 25 MG 24 hr tablet TAKE 1 TABLET DAILY 90 tablet 3    MULTIVITAMIN W-MINERALS/LUTEIN (CENTRUM SILVER ORAL) Take by mouth once daily.        ramipriL (ALTACE) 10 MG capsule TAKE 1 CAPSULE DAILY 90 capsule 3     No current facility-administered medications for this visit.       ROS  Review of Systems   Constitutional:  Negative for activity change, chills and fever.   HENT:  Negative for congestion and hearing loss.    Eyes:  Negative for pain and visual disturbance.   Respiratory:  Negative for cough and shortness of breath.    Cardiovascular:  Negative for chest pain and palpitations.   Gastrointestinal:  Negative for abdominal pain, constipation, diarrhea, nausea and vomiting.   Endocrine: Negative.    Genitourinary: Negative.    Musculoskeletal:  Negative for arthralgias and myalgias.   Skin: Negative.    Allergic/Immunologic: Negative.    Neurological:  Negative for dizziness, light-headedness and headaches.   Hematological: Negative.        OBJECTIVE     Physical Exam  Vitals:    02/23/23 1011   BP: (!) 128/90   Pulse: 84    Body mass index is 28.87 kg/m².            Physical Exam  Vitals reviewed.   Constitutional:       General: He is not in acute distress.      Appearance: Normal appearance.   HENT:      Head: Normocephalic and atraumatic.      Mouth/Throat:      Mouth: Mucous membranes are moist.      Pharynx: Oropharynx is clear.   Eyes:      Extraocular Movements: Extraocular movements intact.      Conjunctiva/sclera: Conjunctivae normal.      Pupils: Pupils are equal, round, and reactive to light.   Cardiovascular:      Rate and Rhythm: Normal rate and regular rhythm.      Pulses: Normal pulses.      Heart sounds: Murmur heard.   Pulmonary:      Effort: Pulmonary effort is normal.      Breath sounds: Normal breath sounds.   Abdominal:      General: Bowel sounds are normal. There is no distension.      Palpations: Abdomen is soft. There is no mass.      Tenderness: There is no abdominal tenderness. There is no guarding.   Musculoskeletal:         General: Normal range of motion.      Cervical back: Normal range of motion and neck supple. No rigidity or tenderness.      Right lower leg: No edema.      Left lower leg: No edema.   Lymphadenopathy:      Cervical: No cervical adenopathy.   Skin:     General: Skin is warm and dry.   Neurological:      General: No focal deficit present.      Mental Status: He is alert.   Psychiatric:         Mood and Affect: Mood normal.         Behavior: Behavior normal.         Health Maintenance         Date Due Completion Date    TETANUS VACCINE Never done ---    Shingles Vaccine (1 of 2) Never done ---    Pneumococcal Vaccines (Age 65+) (1 - PCV) Never done ---    Lipid Panel 01/15/2019 1/15/2014    COVID-19 Vaccine (3 - Booster for Moderna series) 05/05/2021 3/10/2021    Influenza Vaccine (1) 09/01/2022 10/19/2020              ASSESSMENT     82 y.o. male with     1. Establishing care with new doctor, encounter for    2. Primary hypertension    3. Nonrheumatic aortic valve insufficiency    4. Aortic valve stenosis, etiology of cardiac valve disease unspecified    5. Coronary artery disease involving native coronary artery of native heart  without angina pectoris    6. Screening for colon cancer    7. Screening PSA (prostate specific antigen)    8. BPH without urinary obstruction    9. Other long term (current) drug therapy        PLAN:     1. Establishing care with new doctor, encounter for  - Prior notes/labs/imaging reviewed.    2. Primary hypertension  - Mildly elevated today. Does not check at home. Likely elevated due to recent diet during [place]ardi [place]as. Will continue to watch diet. Encouraged monitoring BP at home.   - Continue current regimen.   - T4, Free; Future  - TSH; Future  - Lipid Panel; Future  - Comprehensive Metabolic Panel; Future  - CBC Auto Differential; Future    3. Nonrheumatic aortic valve insufficiency  - Mild BAINS, otherwise asymptomatic. To follow up with Cardiology soon.     4. Aortic valve stenosis, etiology of cardiac valve disease unspecified  - Mild BAINS, otherwise asymptomatic. To follow up with Cardiology soon.     5. Coronary artery disease involving native coronary artery of native heart without angina pectoris  - Stable on current regimen, continue.   - Hemoglobin A1C; Future  - T4, Free; Future  - TSH; Future  - Lipid Panel; Future  - Comprehensive Metabolic Panel; Future  - CBC Auto Differential; Future    6. Screening for colon cancer  - One colonoscopy at age 49. Will order cologuard, if negative can stop colon cancer screens. If positive, will need colonoscopy. Patient understands this and is in agreement with this plan.   - Cologuard Screening (Multitarget Stool DNA); Future      7. Screening PSA (prostate specific antigen)  - PSA, SCREENING; Future    8. BPH without urinary obstruction  - No LUTS.   - Continue to monitor.     9. Other long term (current) drug therapy  - Hemoglobin A1C; Future        RTC in 6 months, earlier PRN.      Earnest Sanford MD  Family Medicine  [facility]  02/23/2023          No follow-ups on file.

## 2023-02-24 LAB — COMPLEXED PSA SERPL-MCNC: 5.2 NG/ML (ref 0–4)

## 2023-03-03 DIAGNOSIS — R06.81 BREATHLESSNESS: Primary | ICD-10-CM

## 2023-03-09 ENCOUNTER — HOSPITAL ENCOUNTER (OUTPATIENT)
Dept: RADIOLOGY | Facility: HOSPITAL | Age: 83
Discharge: HOME | End: 2023-03-09
Attending: INTERNAL MEDICINE
Payer: MEDICARE

## 2023-03-09 DIAGNOSIS — R06.81 BREATHLESSNESS: ICD-10-CM

## 2023-03-09 PROCEDURE — 71250 CT CHEST WITHOUT CONTRAST: ICD-10-PCS | Mod: 26,,, | Performed by: RADIOLOGY

## 2023-03-09 PROCEDURE — 71250 CT THORAX DX C-: CPT | Mod: 26,,, | Performed by: RADIOLOGY

## 2023-03-09 PROCEDURE — 71250 CT THORAX DX C-: CPT | Mod: TC

## 2023-03-11 LAB — NONINV COLON CA DNA+OCC BLD SCRN STL QL: NEGATIVE

## 2023-08-25 ENCOUNTER — OFFICE VISIT (OUTPATIENT)
Dept: INTERNAL MEDICINE | Facility: CLINIC | Age: 83
End: 2023-08-25
Payer: MEDICARE

## 2023-08-25 VITALS
HEART RATE: 65 BPM | WEIGHT: 195.13 LBS | HEIGHT: 71 IN | OXYGEN SATURATION: 98 % | DIASTOLIC BLOOD PRESSURE: 70 MMHG | BODY MASS INDEX: 27.32 KG/M2 | SYSTOLIC BLOOD PRESSURE: 110 MMHG

## 2023-08-25 DIAGNOSIS — I50.30 NYHA CLASS 2 HEART FAILURE WITH PRESERVED EJECTION FRACTION: ICD-10-CM

## 2023-08-25 DIAGNOSIS — I48.91 ATRIAL FIBRILLATION, UNSPECIFIED TYPE: ICD-10-CM

## 2023-08-25 DIAGNOSIS — I35.0 NONRHEUMATIC AORTIC VALVE STENOSIS: ICD-10-CM

## 2023-08-25 DIAGNOSIS — Z23 NEED FOR SHINGLES VACCINE: ICD-10-CM

## 2023-08-25 DIAGNOSIS — I10 PRIMARY HYPERTENSION: ICD-10-CM

## 2023-08-25 DIAGNOSIS — Z95.2 S/P TAVR (TRANSCATHETER AORTIC VALVE REPLACEMENT): Primary | ICD-10-CM

## 2023-08-25 PROBLEM — I50.33 ACUTE ON CHRONIC DIASTOLIC CONGESTIVE HEART FAILURE: Status: ACTIVE | Noted: 2023-05-05

## 2023-08-25 PROBLEM — Z95.3 S/P TAVR (TRANSCATHETER AORTIC VALVE REPLACEMENT): Status: ACTIVE | Noted: 2023-03-29

## 2023-08-25 PROBLEM — E78.5 HYPERLIPIDEMIA: Status: ACTIVE | Noted: 2023-05-11

## 2023-08-25 PROCEDURE — 3078F DIAST BP <80 MM HG: CPT | Mod: CPTII,S$GLB,, | Performed by: STUDENT IN AN ORGANIZED HEALTH CARE EDUCATION/TRAINING PROGRAM

## 2023-08-25 PROCEDURE — 1126F AMNT PAIN NOTED NONE PRSNT: CPT | Mod: CPTII,S$GLB,, | Performed by: STUDENT IN AN ORGANIZED HEALTH CARE EDUCATION/TRAINING PROGRAM

## 2023-08-25 PROCEDURE — 3074F SYST BP LT 130 MM HG: CPT | Mod: CPTII,S$GLB,, | Performed by: STUDENT IN AN ORGANIZED HEALTH CARE EDUCATION/TRAINING PROGRAM

## 2023-08-25 PROCEDURE — 99999 PR PBB SHADOW E&M-EST. PATIENT-LVL III: ICD-10-PCS | Mod: PBBFAC,,, | Performed by: STUDENT IN AN ORGANIZED HEALTH CARE EDUCATION/TRAINING PROGRAM

## 2023-08-25 PROCEDURE — 1101F PR PT FALLS ASSESS DOC 0-1 FALLS W/OUT INJ PAST YR: ICD-10-PCS | Mod: CPTII,S$GLB,, | Performed by: STUDENT IN AN ORGANIZED HEALTH CARE EDUCATION/TRAINING PROGRAM

## 2023-08-25 PROCEDURE — 99999 PR PBB SHADOW E&M-EST. PATIENT-LVL III: CPT | Mod: PBBFAC,,, | Performed by: STUDENT IN AN ORGANIZED HEALTH CARE EDUCATION/TRAINING PROGRAM

## 2023-08-25 PROCEDURE — 3288F FALL RISK ASSESSMENT DOCD: CPT | Mod: CPTII,S$GLB,, | Performed by: STUDENT IN AN ORGANIZED HEALTH CARE EDUCATION/TRAINING PROGRAM

## 2023-08-25 PROCEDURE — 99214 PR OFFICE/OUTPT VISIT, EST, LEVL IV, 30-39 MIN: ICD-10-PCS | Mod: S$GLB,,, | Performed by: STUDENT IN AN ORGANIZED HEALTH CARE EDUCATION/TRAINING PROGRAM

## 2023-08-25 PROCEDURE — 3074F PR MOST RECENT SYSTOLIC BLOOD PRESSURE < 130 MM HG: ICD-10-PCS | Mod: CPTII,S$GLB,, | Performed by: STUDENT IN AN ORGANIZED HEALTH CARE EDUCATION/TRAINING PROGRAM

## 2023-08-25 PROCEDURE — 1126F PR PAIN SEVERITY QUANTIFIED, NO PAIN PRESENT: ICD-10-PCS | Mod: CPTII,S$GLB,, | Performed by: STUDENT IN AN ORGANIZED HEALTH CARE EDUCATION/TRAINING PROGRAM

## 2023-08-25 PROCEDURE — 3078F PR MOST RECENT DIASTOLIC BLOOD PRESSURE < 80 MM HG: ICD-10-PCS | Mod: CPTII,S$GLB,, | Performed by: STUDENT IN AN ORGANIZED HEALTH CARE EDUCATION/TRAINING PROGRAM

## 2023-08-25 PROCEDURE — 1159F PR MEDICATION LIST DOCUMENTED IN MEDICAL RECORD: ICD-10-PCS | Mod: CPTII,S$GLB,, | Performed by: STUDENT IN AN ORGANIZED HEALTH CARE EDUCATION/TRAINING PROGRAM

## 2023-08-25 PROCEDURE — 1160F PR REVIEW ALL MEDS BY PRESCRIBER/CLIN PHARMACIST DOCUMENTED: ICD-10-PCS | Mod: CPTII,S$GLB,, | Performed by: STUDENT IN AN ORGANIZED HEALTH CARE EDUCATION/TRAINING PROGRAM

## 2023-08-25 PROCEDURE — 1159F MED LIST DOCD IN RCRD: CPT | Mod: CPTII,S$GLB,, | Performed by: STUDENT IN AN ORGANIZED HEALTH CARE EDUCATION/TRAINING PROGRAM

## 2023-08-25 PROCEDURE — 3288F PR FALLS RISK ASSESSMENT DOCUMENTED: ICD-10-PCS | Mod: CPTII,S$GLB,, | Performed by: STUDENT IN AN ORGANIZED HEALTH CARE EDUCATION/TRAINING PROGRAM

## 2023-08-25 PROCEDURE — 1160F RVW MEDS BY RX/DR IN RCRD: CPT | Mod: CPTII,S$GLB,, | Performed by: STUDENT IN AN ORGANIZED HEALTH CARE EDUCATION/TRAINING PROGRAM

## 2023-08-25 PROCEDURE — 1101F PT FALLS ASSESS-DOCD LE1/YR: CPT | Mod: CPTII,S$GLB,, | Performed by: STUDENT IN AN ORGANIZED HEALTH CARE EDUCATION/TRAINING PROGRAM

## 2023-08-25 PROCEDURE — 99214 OFFICE O/P EST MOD 30 MIN: CPT | Mod: S$GLB,,, | Performed by: STUDENT IN AN ORGANIZED HEALTH CARE EDUCATION/TRAINING PROGRAM

## 2023-08-25 RX ORDER — SPIRONOLACTONE 25 MG/1
25 TABLET ORAL
COMMUNITY
Start: 2023-08-23

## 2023-08-25 RX ORDER — AMIODARONE HYDROCHLORIDE 200 MG/1
100 TABLET ORAL DAILY
COMMUNITY
Start: 2023-06-22

## 2023-08-25 RX ORDER — FUROSEMIDE 40 MG/1
40 TABLET ORAL 2 TIMES DAILY
COMMUNITY

## 2023-08-25 NOTE — PROGRESS NOTES
SUBJECTIVE     Chief Complaint   Patient presents with    Hyperlipidemia       HPI  Miguel Sawant Jr. is a 83 y.o. male with   HTN, HLD, aortic stenosis, aortic insufficiency, CAD  that presents for follow-up, LOV 2/23/23.     Now s/p TAVR on 3/29/23. Recovery complicated by new-onset Afib. Underwent DCCV x 2. Now on Eliquis, Amiodarone, Lasix, Aldactone. Was having weight gain and BAINS, weight back to baseline on Lasix. States he is feeling great. Tolerating medications well.     Interested in Shingles vaccine today.         PAST MEDICAL HISTORY:  Past Medical History:   Diagnosis Date    Blood transfusion     Heart murmur     Hypertension        PAST SURGICAL HISTORY:  Past Surgical History:   Procedure Laterality Date    JOINT REPLACEMENT      left    KNEE ARTHROSCOPY      LEFT HEART CATHETERIZATION Right 7/30/2021    Procedure: CATHETERIZATION, HEART, LEFT LV MEGHNA POSSIBLE;  Surgeon: Paco Queen MD;  Location: [facility] CATH LAB;  Service: Cardiology;  Laterality: Right;    RIGHT HEART CATHETERIZATION Right 7/30/2021    Procedure: INSERTION, CATHETER, RIGHT HEART LV MEGHNA POSSIBLE;  Surgeon: Paco Queen MD;  Location: [facility] CATH LAB;  Service: Cardiology;  Laterality: Right;    TONSILLECTOMY         FAMILY HISTORY:  Family History   Problem Relation Age of Onset    Cancer Father     COPD Father        ALLERGIES AND MEDICATIONS: updated and reviewed.  Review of patient's allergies indicates:  No Known Allergies  Current Outpatient Medications   Medication Sig Dispense Refill    amiodarone (PACERONE) 200 MG Tab Take 1 tablet by mouth once daily.      apixaban (ELIQUIS) 5 mg Tab Take 5 mg by mouth.      aspirin (ECOTRIN) 81 MG EC tablet Take 81 mg by mouth once daily.        atorvastatin (LIPITOR) 20 MG tablet TAKE 1 TABLET DAILY 90 tablet 4    glucosamine-chondroitin 500-400 mg tablet Take 1 tablet by mouth 3 (three) times daily.        metoprolol succinate (TOPROL-XL) 25 MG 24 hr tablet TAKE 1 TABLET  "DAILY 90 tablet 3    MULTIVITAMIN W-MINERALS/LUTEIN (CENTRUM SILVER ORAL) Take by mouth once daily.        ramipriL (ALTACE) 10 MG capsule TAKE 1 CAPSULE DAILY 90 capsule 3    furosemide (LASIX) 40 MG tablet Take 40 mg by mouth 2 (two) times daily.      hydroCHLOROthiazide (HYDRODIURIL) 25 MG tablet TAKE 1 TABLET THREE TIMES A WEEK WITH ORANGE JUICE 90 tablet 4    spironolactone (ALDACTONE) 25 MG tablet Take 25 mg by mouth.       No current facility-administered medications for this visit.       ROS  Review of Systems   Constitutional:  Negative for activity change, chills and fever.   HENT:  Negative for congestion and hearing loss.    Eyes:  Negative for pain and visual disturbance.   Respiratory:  Negative for cough and shortness of breath.    Cardiovascular:  Negative for chest pain and palpitations.   Gastrointestinal:  Negative for abdominal pain, constipation, diarrhea, nausea and vomiting.   Endocrine: Negative.    Genitourinary: Negative.    Musculoskeletal:  Negative for arthralgias and myalgias.   Skin: Negative.    Allergic/Immunologic: Negative.    Neurological:  Negative for dizziness, light-headedness and headaches.   Hematological: Negative.          OBJECTIVE     Physical Exam  Vitals:    08/25/23 1015   BP: 110/70   Pulse: 65    Body mass index is 27.21 kg/m².  Weight: 88.5 kg (195 lb 1.7 oz)   Height: 5' 11" (180.3 cm)     Physical Exam  Vitals reviewed.   Constitutional:       General: He is not in acute distress.     Appearance: Normal appearance.   HENT:      Head: Normocephalic and atraumatic.      Mouth/Throat:      Mouth: Mucous membranes are moist.      Pharynx: Oropharynx is clear.   Eyes:      Extraocular Movements: Extraocular movements intact.      Conjunctiva/sclera: Conjunctivae normal.      Pupils: Pupils are equal, round, and reactive to light.   Cardiovascular:      Rate and Rhythm: Normal rate and regular rhythm.      Pulses: Normal pulses.      Heart sounds: Normal heart sounds. "   Pulmonary:      Effort: Pulmonary effort is normal.      Breath sounds: Normal breath sounds.   Abdominal:      General: There is no distension.   Musculoskeletal:         General: Normal range of motion.      Cervical back: Normal range of motion and neck supple.      Right lower leg: No edema.      Left lower leg: No edema.   Skin:     General: Skin is warm and dry.   Neurological:      General: No focal deficit present.      Mental Status: He is alert.           Health Maintenance         Date Due Completion Date    TETANUS VACCINE Never done ---    Shingles Vaccine (1 of 2) Never done ---    Pneumococcal Vaccines (Age 65+) (1 - PCV) Never done ---    COVID-19 Vaccine (4 - Moderna series) 12/27/2021 11/1/2021    Influenza Vaccine (1) 09/01/2023 11/1/2021    Lipid Panel 02/23/2028 2/23/2023              ASSESSMENT     83 y.o. male with     1. S/P TAVR (transcatheter aortic valve replacement)    2. Nonrheumatic aortic valve stenosis    3. NYHA class 2 heart failure with preserved ejection fraction    4. Primary hypertension    5. Atrial fibrillation, unspecified type    6. Need for shingles vaccine        PLAN:     1. S/P TAVR (transcatheter aortic valve replacement)  - Asymptomatic. Continue follow up with Cardiology.     2. Nonrheumatic aortic valve stenosis  - Now s/p TAVR. Continue follow up with Cardiology.     3. NYHA class 2 heart failure with preserved ejection fraction  - Euvolemic on exam today. Continue lasix, BB, aldactone, statin, ACEi.      4. Primary hypertension  - Controlled on Ramipril, Metoprolol succinate, Spironolactone. Continue.     5. Atrial fibrillation, unspecified type  - Now s/p DCCV x 2. RRR on exam today while on Amiodarone, metoprolol. Continue. Continue follow up with Cardiology.     6. Need for shingles vaccine  - Amenable to receiving today.         RTC in 6 months, earlier PRN       Earnest Sanford MD  Family Medicine  [facility]  08/25/2023          No follow-ups on file.

## 2024-02-28 ENCOUNTER — OFFICE VISIT (OUTPATIENT)
Dept: INTERNAL MEDICINE | Facility: CLINIC | Age: 84
End: 2024-02-28
Payer: MEDICARE

## 2024-02-28 VITALS
BODY MASS INDEX: 28.72 KG/M2 | DIASTOLIC BLOOD PRESSURE: 72 MMHG | WEIGHT: 205.94 LBS | OXYGEN SATURATION: 97 % | HEART RATE: 67 BPM | SYSTOLIC BLOOD PRESSURE: 118 MMHG

## 2024-02-28 DIAGNOSIS — Z95.2 S/P TAVR (TRANSCATHETER AORTIC VALVE REPLACEMENT): ICD-10-CM

## 2024-02-28 DIAGNOSIS — I48.91 ATRIAL FIBRILLATION, UNSPECIFIED TYPE: ICD-10-CM

## 2024-02-28 DIAGNOSIS — I35.0 NONRHEUMATIC AORTIC VALVE STENOSIS: ICD-10-CM

## 2024-02-28 DIAGNOSIS — I10 PRIMARY HYPERTENSION: ICD-10-CM

## 2024-02-28 DIAGNOSIS — I25.10 CORONARY ARTERY DISEASE INVOLVING NATIVE CORONARY ARTERY OF NATIVE HEART WITHOUT ANGINA PECTORIS: ICD-10-CM

## 2024-02-28 DIAGNOSIS — M17.12 PRIMARY OSTEOARTHRITIS OF LEFT KNEE: ICD-10-CM

## 2024-02-28 DIAGNOSIS — I50.30 NYHA CLASS 2 HEART FAILURE WITH PRESERVED EJECTION FRACTION: ICD-10-CM

## 2024-02-28 DIAGNOSIS — Z00.00 ANNUAL PHYSICAL EXAM: Primary | ICD-10-CM

## 2024-02-28 PROBLEM — I50.33 ACUTE ON CHRONIC DIASTOLIC CONGESTIVE HEART FAILURE: Status: RESOLVED | Noted: 2023-05-05 | Resolved: 2024-02-28

## 2024-02-28 PROCEDURE — 99999 PR PBB SHADOW E&M-EST. PATIENT-LVL III: CPT | Mod: PBBFAC,,, | Performed by: STUDENT IN AN ORGANIZED HEALTH CARE EDUCATION/TRAINING PROGRAM

## 2024-02-28 PROCEDURE — 99397 PER PM REEVAL EST PAT 65+ YR: CPT | Mod: S$GLB,,, | Performed by: STUDENT IN AN ORGANIZED HEALTH CARE EDUCATION/TRAINING PROGRAM

## 2024-02-28 PROCEDURE — 3288F FALL RISK ASSESSMENT DOCD: CPT | Mod: CPTII,S$GLB,, | Performed by: STUDENT IN AN ORGANIZED HEALTH CARE EDUCATION/TRAINING PROGRAM

## 2024-02-28 PROCEDURE — 3078F DIAST BP <80 MM HG: CPT | Mod: CPTII,S$GLB,, | Performed by: STUDENT IN AN ORGANIZED HEALTH CARE EDUCATION/TRAINING PROGRAM

## 2024-02-28 PROCEDURE — 1160F RVW MEDS BY RX/DR IN RCRD: CPT | Mod: CPTII,S$GLB,, | Performed by: STUDENT IN AN ORGANIZED HEALTH CARE EDUCATION/TRAINING PROGRAM

## 2024-02-28 PROCEDURE — 1101F PT FALLS ASSESS-DOCD LE1/YR: CPT | Mod: CPTII,S$GLB,, | Performed by: STUDENT IN AN ORGANIZED HEALTH CARE EDUCATION/TRAINING PROGRAM

## 2024-02-28 PROCEDURE — 3074F SYST BP LT 130 MM HG: CPT | Mod: CPTII,S$GLB,, | Performed by: STUDENT IN AN ORGANIZED HEALTH CARE EDUCATION/TRAINING PROGRAM

## 2024-02-28 PROCEDURE — 1159F MED LIST DOCD IN RCRD: CPT | Mod: CPTII,S$GLB,, | Performed by: STUDENT IN AN ORGANIZED HEALTH CARE EDUCATION/TRAINING PROGRAM

## 2024-02-28 NOTE — PROGRESS NOTES
SUBJECTIVE     Chief Complaint   Patient presents with    Follow-up       HPI  Miguel Sawant Jr. is a 84 y.o. male with  HTN, HLD, pAFib, CAD, aortic valve stenosis s/p TAVR, Diastolic CHF   that presents for follow-up. LOV 8/25/24    Today is patient's 84th birthday!  Patient doing well today.   Follows with cardiology at [facility].   Had cardioversion in Summer 2023 for atrial fibrillation, rhythm remains regular. Very happy with results.   Reports having several labs done earlier this week with his cardiologist. Release forms signed.   Dealing with some osteoarthritis in the left knee. Follows with Dr. Garrido at [facility]. Possible replacement in near future.   No other complaints today.   Staying active.     PAST MEDICAL HISTORY:  Past Medical History:   Diagnosis Date    Blood transfusion     Heart murmur     Hypertension        PAST SURGICAL HISTORY:  Past Surgical History:   Procedure Laterality Date    JOINT REPLACEMENT      left    KNEE ARTHROSCOPY      LEFT HEART CATHETERIZATION Right 7/30/2021    Procedure: CATHETERIZATION, HEART, LEFT LV MEGHNA POSSIBLE;  Surgeon: Paco Queen MD;  Location: [facility] CATH LAB;  Service: Cardiology;  Laterality: Right;    RIGHT HEART CATHETERIZATION Right 7/30/2021    Procedure: INSERTION, CATHETER, RIGHT HEART LV MEGHNA POSSIBLE;  Surgeon: Paco Queen MD;  Location: [facility] CATH LAB;  Service: Cardiology;  Laterality: Right;    TONSILLECTOMY         FAMILY HISTORY:  Family History   Problem Relation Age of Onset    Cancer Father     COPD Father        ALLERGIES AND MEDICATIONS: updated and reviewed.  Review of patient's allergies indicates:  No Known Allergies  Current Outpatient Medications   Medication Sig Dispense Refill    amiodarone (PACERONE) 200 MG Tab Take 1 tablet by mouth once daily.      apixaban (ELIQUIS) 5 mg Tab Take 5 mg by mouth.      aspirin (ECOTRIN) 81 MG EC tablet Take 81 mg by mouth once daily.        atorvastatin (LIPITOR) 20 MG  tablet TAKE 1 TABLET DAILY 90 tablet 4    furosemide (LASIX) 40 MG tablet Take 40 mg by mouth 2 (two) times daily.      glucosamine-chondroitin 500-400 mg tablet Take 1 tablet by mouth 3 (three) times daily.        metoprolol succinate (TOPROL-XL) 25 MG 24 hr tablet TAKE 1 TABLET DAILY 90 tablet 3    MULTIVITAMIN W-MINERALS/LUTEIN (CENTRUM SILVER ORAL) Take by mouth once daily.        ramipriL (ALTACE) 10 MG capsule TAKE 1 CAPSULE DAILY 90 capsule 3    spironolactone (ALDACTONE) 25 MG tablet Take 25 mg by mouth.       No current facility-administered medications for this visit.       ROS  Review of Systems   Constitutional:  Negative for activity change, chills and fever.   HENT:  Negative for congestion and hearing loss.    Eyes:  Negative for pain and visual disturbance.   Respiratory:  Negative for cough and shortness of breath.    Cardiovascular:  Negative for chest pain and palpitations.   Gastrointestinal:  Negative for abdominal pain, constipation, diarrhea, nausea and vomiting.   Endocrine: Negative.    Genitourinary: Negative.    Musculoskeletal:  Positive for arthralgias. Negative for myalgias.   Skin: Negative.    Allergic/Immunologic: Negative.    Neurological:  Negative for dizziness, light-headedness and headaches.   Hematological: Negative.          OBJECTIVE     Physical Exam  Vitals:    02/28/24 0849   BP: 118/72   Pulse: 67    Body mass index is 28.72 kg/m².            Physical Exam  Vitals reviewed.   Constitutional:       General: He is not in acute distress.     Appearance: Normal appearance.   HENT:      Head: Normocephalic and atraumatic.      Mouth/Throat:      Mouth: Mucous membranes are moist.      Pharynx: Oropharynx is clear.   Eyes:      Extraocular Movements: Extraocular movements intact.      Conjunctiva/sclera: Conjunctivae normal.      Pupils: Pupils are equal, round, and reactive to light.   Cardiovascular:      Rate and Rhythm: Normal rate and regular rhythm.      Pulses: Normal  pulses.      Heart sounds: Normal heart sounds.   Pulmonary:      Effort: Pulmonary effort is normal.      Breath sounds: Normal breath sounds.   Abdominal:      General: There is no distension.   Musculoskeletal:         General: Normal range of motion.      Cervical back: Normal range of motion and neck supple.      Right lower leg: No edema.      Left lower leg: No edema.   Skin:     General: Skin is warm and dry.   Neurological:      General: No focal deficit present.      Mental Status: He is alert.           Health Maintenance         Date Due Completion Date    TETANUS VACCINE Never done ---    RSV Vaccine (Age 60+ and Pregnant patients) (1 - 1-dose 60+ series) Never done ---    Pneumococcal Vaccines (Age 65+) (1 of 1 - PCV) Never done ---    Influenza Vaccine (1) 09/01/2023 11/1/2021    COVID-19 Vaccine (4 - 2023-24 season) 09/01/2023 11/1/2021    Shingles Vaccine (2 of 2) 10/20/2023 8/25/2023    Lipid Panel 02/23/2028 2/23/2023              ASSESSMENT     84 y.o. male with     1. Annual physical exam    2. Primary hypertension    3. S/P TAVR (transcatheter aortic valve replacement)    4. Atrial fibrillation, unspecified type    5. NYHA class 2 heart failure with preserved ejection fraction    6. Coronary artery disease involving native coronary artery of native heart without angina pectoris    7. Nonrheumatic aortic valve stenosis    8. Primary osteoarthritis of left knee        PLAN:     1. Annual physical exam  - Discussed age and gender appropriate screenings at this visit. Continue watching diet, staying active  Counseled on medically appropriate vaccines based on age and current health condition.  Screening test reviewed and discussed with patient.   - HCM: Amenable to receiving COVID-19 booster and 2nd dose of Shingles vaccine.     2. Primary hypertension  - Controlled on current regimen. Continue.     3. S/P TAVR (transcatheter aortic valve replacement)  - Asymptomatic. Continue follow up with  Cardiology.     4. Atrial fibrillation, unspecified type  - RRR on exam today. Continue current regimen as managed by Cardiology.     5. NYHA class 2 heart failure with preserved ejection fraction  - Euvolemic on exam today. Continue current regimen as managed by Cardiology.     6. Coronary artery disease involving native coronary artery of native heart without angina pectoris  - Asymptomatic. Continue follow up with Cardiology.     7. Nonrheumatic aortic valve stenosis  - Asymptomatic s/p TAVR. Continue follow up with Cardiology.     8. Primary osteoarthritis of left knee  - Stable. Continue follow up with Ortho.         RTC in 6 months, earlier PRN       Earnest Sanford MD  Family Medicine  [facility]  02/28/2024    This document was created using voice recognition software ([employer] Direct). Although it may be edited, this document may contain errors related to incorrect recognition of the spoken word. Please call the physician if clarification is needed.       No follow-ups on file.

## 2024-04-01 ENCOUNTER — TELEPHONE (OUTPATIENT)
Dept: INTERNAL MEDICINE | Facility: CLINIC | Age: 84
End: 2024-04-01
Payer: MEDICARE

## 2024-04-01 NOTE — TELEPHONE ENCOUNTER
Call pt and gave pt advance scheduling number to schedule pre op.   BIBA,  pt c/o LLQ abd pain N/V/D x 2 days.  (PMH- [id number])  EMS gave zofran 4mg IM and 50mcg Fentanyl, IM

## 2024-04-01 NOTE — TELEPHONE ENCOUNTER
----- Message from Mary Gallardo NP sent at 4/1/2024  3:03 PM CDT -----  Pt needs to be seen by a physician for clearance. Maybe Dr. Moreno or Dr. Rueda can see him? Dr. Sanford won't be back for a while.  May need clearance with cardiology as well. Please ask if his cardiologist as cleared him for his surgery.   ----- Message -----  From: Ghanshyam Marion MA  Sent: 3/28/2024   3:53 PM CDT  To: Earnest Sanford MD      ----- Message -----  From: Danny Mata MA  Sent: 3/28/2024   2:45 PM CDT  To: Claribel RASMUSSEN Staff    The patient calling to get a clearance for his surgery on 04/22. Please fax to [phone]. Give the patient a call back at [phone].

## 2024-04-04 ENCOUNTER — OFFICE VISIT (OUTPATIENT)
Dept: INTERNAL MEDICINE | Facility: CLINIC | Age: 84
End: 2024-04-04
Payer: MEDICARE

## 2024-04-04 VITALS
HEART RATE: 60 BPM | HEIGHT: 71 IN | OXYGEN SATURATION: 95 % | BODY MASS INDEX: 29.04 KG/M2 | SYSTOLIC BLOOD PRESSURE: 110 MMHG | WEIGHT: 207.44 LBS | DIASTOLIC BLOOD PRESSURE: 52 MMHG

## 2024-04-04 DIAGNOSIS — I50.30 NYHA CLASS 2 HEART FAILURE WITH PRESERVED EJECTION FRACTION: ICD-10-CM

## 2024-04-04 DIAGNOSIS — I48.91 ATRIAL FIBRILLATION, UNSPECIFIED TYPE: ICD-10-CM

## 2024-04-04 DIAGNOSIS — I35.1 NONRHEUMATIC AORTIC VALVE INSUFFICIENCY: ICD-10-CM

## 2024-04-04 DIAGNOSIS — Z95.2 S/P TAVR (TRANSCATHETER AORTIC VALVE REPLACEMENT): ICD-10-CM

## 2024-04-04 DIAGNOSIS — I25.10 CORONARY ARTERY DISEASE INVOLVING NATIVE CORONARY ARTERY OF NATIVE HEART WITHOUT ANGINA PECTORIS: ICD-10-CM

## 2024-04-04 DIAGNOSIS — I35.0 NONRHEUMATIC AORTIC VALVE STENOSIS: ICD-10-CM

## 2024-04-04 DIAGNOSIS — E78.5 HYPERLIPIDEMIA, UNSPECIFIED HYPERLIPIDEMIA TYPE: ICD-10-CM

## 2024-04-04 DIAGNOSIS — I10 PRIMARY HYPERTENSION: ICD-10-CM

## 2024-04-04 DIAGNOSIS — Z01.818 PRE-OP EVALUATION: Primary | ICD-10-CM

## 2024-04-04 PROCEDURE — 3074F SYST BP LT 130 MM HG: CPT | Mod: CPTII,S$GLB,, | Performed by: PHYSICIAN ASSISTANT

## 2024-04-04 PROCEDURE — 1159F MED LIST DOCD IN RCRD: CPT | Mod: CPTII,S$GLB,, | Performed by: PHYSICIAN ASSISTANT

## 2024-04-04 PROCEDURE — 99999 PR PBB SHADOW E&M-EST. PATIENT-LVL III: CPT | Mod: PBBFAC,,, | Performed by: PHYSICIAN ASSISTANT

## 2024-04-04 PROCEDURE — 3078F DIAST BP <80 MM HG: CPT | Mod: CPTII,S$GLB,, | Performed by: PHYSICIAN ASSISTANT

## 2024-04-04 PROCEDURE — 99214 OFFICE O/P EST MOD 30 MIN: CPT | Mod: S$GLB,,, | Performed by: PHYSICIAN ASSISTANT

## 2024-04-04 PROCEDURE — 3288F FALL RISK ASSESSMENT DOCD: CPT | Mod: CPTII,S$GLB,, | Performed by: PHYSICIAN ASSISTANT

## 2024-04-04 PROCEDURE — 1101F PT FALLS ASSESS-DOCD LE1/YR: CPT | Mod: CPTII,S$GLB,, | Performed by: PHYSICIAN ASSISTANT

## 2024-04-04 NOTE — PROGRESS NOTES
INTERNAL MEDICINE URGENT VISIT NOTE    CHIEF COMPLAINT     Chief Complaint   Patient presents with    Pre-op Exam       HPI     Miguel Sawant Jr. is a 84 y.o. male who presents for an urgent visit today.    PCP is Earnest Sanford MD, patient is new to me. Surgery is Monday April 22 and Dr. Soriano   Right knee replacement     Cr: normal baseline  DM: no history   CHF: yes  MI: none   CVA: no history   TIA: no history   Trouble with anesthesia in the past? Had neurogenetic bladder after last surgery, resolved after about 3 days    Family history of sudden cardiac death? None   ADLs: able to ascend a flight of stairs without chest pain or SOB   Anticoagulation: on apixaban   TERESA: none   Cigarettes: none   Opioids: none   Dental implants/hardware that is removable: none     RCRI%  6.0    Pt has already been cleared by cardiology   Follows with Dr. Queen     Fax clearance to fer [phone]  [facility] Ortho Dr. Soriano     No new diagnostics felt warranted - will wait to review pre-op labs, expect that this patient will be medically optimized for this low risk surgery. RCRI expected to be 6.0%    Past Medical History:  Past Medical History:   Diagnosis Date    Blood transfusion     Heart murmur     Hypertension        Home Medications:  Prior to Admission medications    Medication Sig Start Date End Date Taking? Authorizing Provider   amiodarone (PACERONE) 200 MG Tab Take 100 mg by mouth once daily. 6/22/23   Provider, Historical   apixaban (ELIQUIS) 5 mg Tab Take 5 mg by mouth. 3/29/23 6/21/24  Provider, Historical   aspirin (ECOTRIN) 81 MG EC tablet Take 81 mg by mouth once daily.      Provider, Historical   atorvastatin (LIPITOR) 20 MG tablet TAKE 1 TABLET DAILY 1/16/20   Paco Queen MD   furosemide (LASIX) 40 MG tablet Take 40 mg by mouth 2 (two) times daily.    Provider, Historical   glucosamine-chondroitin 500-400 mg tablet Take 1 tablet by mouth 3 (three) times daily.      Provider, Historical  "  metoprolol succinate (TOPROL-XL) 25 MG 24 hr tablet TAKE 1 TABLET DAILY 4/23/21   Paco Queen MD   MULTIVITAMIN W-MINERALS/LUTEIN (CENTRUM SILVER ORAL) Take by mouth once daily.      Provider, Historical   ramipriL (ALTACE) 10 MG capsule TAKE 1 CAPSULE DAILY 10/27/20   Paco Queen MD   spironolactone (ALDACTONE) 25 MG tablet Take 25 mg by mouth. 8/23/23   Provider, Historical       Review of Systems:  Review of Systems   Constitutional:  Negative for chills and fever.   HENT:  Negative for sore throat and trouble swallowing.    Eyes:  Negative for visual disturbance.   Respiratory:  Negative for cough and shortness of breath.    Cardiovascular:  Negative for chest pain.   Gastrointestinal:  Negative for abdominal pain, constipation, diarrhea, nausea and vomiting.   Genitourinary:  Negative for dysuria and flank pain.   Musculoskeletal:  Negative for back pain, neck pain and neck stiffness.   Skin:  Negative for rash.   Neurological:  Negative for dizziness, syncope, weakness and headaches.   Psychiatric/Behavioral:  Negative for confusion.        Health Maintainence:   Immunizations:  Health Maintenance         Date Due Completion Date    TETANUS VACCINE Never done ---    RSV Vaccine (Age 60+ and Pregnant patients) (1 - 1-dose 60+ series) Never done ---    Pneumococcal Vaccines (Age 65+) (1 of 1 - PCV) Never done ---    Influenza Vaccine (1) 09/01/2023 11/1/2021    Lipid Panel 02/23/2028 2/23/2023             PHYSICAL EXAM     BP (!) 110/52   Pulse 60   Ht 5' 11" (1.803 m)   Wt 94.1 kg (207 lb 7.3 oz)   SpO2 95%   BMI 28.93 kg/m²     Physical Exam  Vitals and nursing note reviewed.   Constitutional:       Appearance: Normal appearance.      Comments: Healthy elderly male in NAD or apparent pain. He makes good eye contact, speaks in clear full sentences and ambulates with ease.    HENT:      Head: Normocephalic and atraumatic.      Nose: Nose normal.      Mouth/Throat:      Pharynx: " Oropharynx is clear.   Eyes:      Conjunctiva/sclera: Conjunctivae normal.   Cardiovascular:      Rate and Rhythm: Normal rate and regular rhythm.      Pulses: Normal pulses.   Pulmonary:      Effort: No respiratory distress.   Abdominal:      Tenderness: There is no abdominal tenderness.   Musculoskeletal:         General: Normal range of motion.      Cervical back: No rigidity.   Skin:     General: Skin is warm and dry.      Capillary Refill: Capillary refill takes less than 2 seconds.      Findings: No rash.   Neurological:      General: No focal deficit present.      Mental Status: He is alert.      Gait: Gait normal.   Psychiatric:         Mood and Affect: Mood normal.         LABS     Lab Results   Component Value Date    HGBA1C 5.2 02/23/2023     CMP  Sodium   Date Value Ref Range Status   05/18/2023 140 135 - 146 mmol/L Final   02/23/2023 138 136 - 145 mmol/L Final     Potassium   Date Value Ref Range Status   05/18/2023 4.2 3.6 - 5.2 mmol/L Final   02/23/2023 4.7 3.5 - 5.1 mmol/L Final     Chloride   Date Value Ref Range Status   02/23/2023 104 95 - 110 mmol/L Final     CO2   Date Value Ref Range Status   02/23/2023 26 23 - 29 mmol/L Final     Carbon Dioxide   Date Value Ref Range Status   05/18/2023 30 24 - 32 mmol/L Final     Glucose   Date Value Ref Range Status   02/23/2023 110 70 - 110 mg/dL Final     BUN   Date Value Ref Range Status   05/18/2023 19.0 7.0 - 25.0 mg/dL Final   02/23/2023 12 8 - 23 mg/dL Final     Creatinine   Date Value Ref Range Status   05/18/2023 0.86 0.70 - 1.40 mg/dL Final   02/23/2023 1.0 0.5 - 1.4 mg/dL Final     Calcium   Date Value Ref Range Status   05/18/2023 9.6 8.4 - 10.3 mg/dL Final   02/23/2023 10.2 8.7 - 10.5 mg/dL Final     Total Protein   Date Value Ref Range Status   02/23/2023 6.5 6.0 - 8.4 g/dL Final     Albumin   Date Value Ref Range Status   05/02/2023 3.7 3.4 - 5.0 g/dL Final   02/23/2023 3.8 3.5 - 5.2 g/dL Final     Total Bilirubin   Date Value Ref Range Status    05/02/2023 1.3 (H) <1.3 mg/dL Final   02/23/2023 0.9 0.1 - 1.0 mg/dL Final     Comment:     For infants and newborns, interpretation of results should be based  on gestational age, weight and in agreement with clinical  observations.    Premature Infant recommended reference ranges:  Up to 24 hours.............<8.0 mg/dL  Up to 48 hours............<12.0 mg/dL  3-5 days..................<15.0 mg/dL  6-29 days.................<15.0 mg/dL       Alkaline Phosphatase   Date Value Ref Range Status   02/23/2023 60 55 - 135 U/L Final     AST   Date Value Ref Range Status   05/02/2023 33 <45 U/L Final   02/23/2023 36 10 - 40 U/L Final     ALT   Date Value Ref Range Status   05/02/2023 32 <46 U/L Final   02/23/2023 35 10 - 44 U/L Final     Anion Gap   Date Value Ref Range Status   02/23/2023 8 8 - 16 mmol/L Final     eGFR if    Date Value Ref Range Status   07/26/2021 >60 >60 mL/min/1.73 m^2 Final     eGFR    Date Value Ref Range Status   05/18/2023 85 (L) >=90 mL/min Final     Comment:     Calculation based on the Chronic Kidney Disease Epidemiology Collaboration (CKD-EPI) equation refit without adjustment for race.     eGFR if non    Date Value Ref Range Status   07/26/2021 >60 >60 mL/min/1.73 m^2 Final     Comment:     Calculation used to obtain the estimated glomerular filtration  rate (eGFR) is the CKD-EPI equation.        Lab Results   Component Value Date    WBC 9.1 05/18/2023    HGB 13.7 05/18/2023    HCT 39.6 (L) 05/18/2023    .8 (H) 05/18/2023     02/23/2023     Lab Results   Component Value Date    CHOL 130 02/23/2023    CHOL 165 01/15/2014     Lab Results   Component Value Date    HDL 53 02/23/2023    HDL 57 01/15/2014     Lab Results   Component Value Date    LDLCALC 67.4 02/23/2023    LDLCALC 90 01/15/2014     Lab Results   Component Value Date    TRIG 48 02/23/2023    TRIG 90 01/15/2014     Lab Results   Component Value Date    CHOLHDL 40.8  02/23/2023    CHOLHDL 2.9 01/15/2014     Lab Results   Component Value Date    TSH 2.686 02/23/2023       ASSESSMENT/PLAN     Miguel Sawant Jr. is a 84 y.o. male     Miguel was seen today for pre-op exam. He has moderate risk because of his cardiac history however he is expected to be medically optimized for this low risk procedure. He has also received cardiac clearance by his cardiologist. He has pre-op labs pending, rather than repeat them, I will wait to review them the made addendum with final report of medical optimization. -[facility]     Diagnoses and all orders for this visit:    Pre-op evaluation    Coronary artery disease involving native coronary artery of native heart without angina pectoris    NYHA class 2 heart failure with preserved ejection fraction    Nonrheumatic aortic valve stenosis    Hyperlipidemia, unspecified hyperlipidemia type    Atrial fibrillation, unspecified type    S/P TAVR (transcatheter aortic valve replacement)    Nonrheumatic aortic valve insufficiency    Primary hypertension      Patient was counseled on when and how to seek emergent care.       Carmina Duncan PA-C   Department of Internal Medicine - [facility]   1:24 PM

## 2024-04-08 NOTE — DISCHARGE INSTRUCTIONS
Knee Athroplasty Discharge Instructions    Pain:   After surgery your knee will be sore. The knee will likely have been injected with a numbing medicine (Exparel) prior to completion of surgery for pain control. This is indicated on a green bracelet that you will continue to wear for 4 days after surgery. Ice and elevation will assist with pain control.    Apply ice as much as possible for the first 72 hours. After 72 hours, apply ice for 20minutes after therapy or whenever experiencing pain. Avoid direct skin contact with ice to prevent frostbit.           Elevate the affected leg with the pillow the length of the leg higher than your heart to assist with swelling and pain.  Incision Care:   Some drainage from the incision in the first 72 hours is normal. If drainage is excessive, reinforce dressing and call home health nurse or therapist. Keep incision clean, dry and covered until staples removed. Staples will be removed 14-21 days after surgery .    Activity:                  Perform exercises 2 x day.   You may shower 48 hours after surgery  providing the dressing is waterproof. You can wrap the knee or hip with press n seal saran wrap to assist with keeping the dressing dry while showering.   No tub or hot tub usage for 4 weeks after surgery. Support help is mandatory during showering. If the  dressing becomes wet, replace with a new dressing. Do not leave a wet dressing on.   Wear deena hose to both extremities  for 3 weeks after surgery .You may remove stockings for 1- 2 hours during the day only.            If your physician orders the CPM machine you are to use it for 2 hours in the am and 2 hours in the pm.Start the machine at -5 extension and 50 degree flexion. Increase flexion 5 degrees each SESSION. This is not to replace your exercise program.    You may need antibiotic coverage before dental or minor surgical procedures.  Possible Complication:  Infections: Report these signs and symptoms to your  surgeon:  Unexpected redness around incision  Persistent drainage from wound after 72 hours  Temperature Greater than 101 degrees F  Additional swelling  Pain not controlled with current pain medication  Blood Clot: Report these signs and symptoms to your surgeon:  Unusual pain, unusual warm skin  Red or discolored skin  Swelling in the leg

## 2024-04-12 ENCOUNTER — HOSPITAL ENCOUNTER (OUTPATIENT)
Dept: PREADMISSION TESTING | Facility: OTHER | Age: 84
Discharge: HOME | End: 2024-04-12
Attending: ORTHOPAEDIC SURGERY
Payer: MEDICARE

## 2024-04-12 ENCOUNTER — ANESTHESIA EVENT (OUTPATIENT)
Dept: SURGERY | Facility: OTHER | Age: 84
End: 2024-04-12
Payer: MEDICARE

## 2024-04-12 VITALS
OXYGEN SATURATION: 96 % | DIASTOLIC BLOOD PRESSURE: 60 MMHG | HEIGHT: 71 IN | BODY MASS INDEX: 28 KG/M2 | SYSTOLIC BLOOD PRESSURE: 128 MMHG | TEMPERATURE: 98 F | WEIGHT: 200 LBS | HEART RATE: 73 BPM

## 2024-04-12 DIAGNOSIS — Z01.818 PREOP TESTING: Primary | ICD-10-CM

## 2024-04-12 LAB
ABO + RH BLD: NORMAL
ALBUMIN SERPL BCP-MCNC: 4.4 G/DL (ref 3.5–5.2)
ALP SERPL-CCNC: 62 U/L (ref 55–135)
ALT SERPL W/O P-5'-P-CCNC: 30 U/L (ref 10–44)
ANION GAP SERPL CALC-SCNC: 10 MMOL/L (ref 8–16)
AST SERPL-CCNC: 32 U/L (ref 10–40)
BASOPHILS # BLD AUTO: 0.05 K/UL (ref 0–0.2)
BASOPHILS NFR BLD: 0.5 % (ref 0–1.9)
BILIRUB SERPL-MCNC: 0.9 MG/DL (ref 0.1–1)
BILIRUB UR QL STRIP: NEGATIVE
BLD GP AB SCN CELLS X3 SERPL QL: NORMAL
BUN SERPL-MCNC: 22 MG/DL (ref 8–23)
CALCIUM SERPL-MCNC: 10.5 MG/DL (ref 8.7–10.5)
CHLORIDE SERPL-SCNC: 101 MMOL/L (ref 95–110)
CLARITY UR: CLEAR
CO2 SERPL-SCNC: 26 MMOL/L (ref 23–29)
COLOR UR: COLORLESS
CREAT SERPL-MCNC: 1.3 MG/DL (ref 0.5–1.4)
DIFFERENTIAL METHOD BLD: ABNORMAL
EOSINOPHIL # BLD AUTO: 0.2 K/UL (ref 0–0.5)
EOSINOPHIL NFR BLD: 2 % (ref 0–8)
ERYTHROCYTE [DISTWIDTH] IN BLOOD BY AUTOMATED COUNT: 12.4 % (ref 11.5–14.5)
EST. GFR  (NO RACE VARIABLE): 54 ML/MIN/1.73 M^2
GLUCOSE SERPL-MCNC: 103 MG/DL (ref 70–110)
GLUCOSE UR QL STRIP: NEGATIVE
HCT VFR BLD AUTO: 44.7 % (ref 40–54)
HGB BLD-MCNC: 15.2 G/DL (ref 14–18)
HGB UR QL STRIP: ABNORMAL
IMM GRANULOCYTES # BLD AUTO: 0.04 K/UL (ref 0–0.04)
IMM GRANULOCYTES NFR BLD AUTO: 0.4 % (ref 0–0.5)
KETONES UR QL STRIP: NEGATIVE
LEUKOCYTE ESTERASE UR QL STRIP: NEGATIVE
LYMPHOCYTES # BLD AUTO: 1.8 K/UL (ref 1–4.8)
LYMPHOCYTES NFR BLD: 19.3 % (ref 18–48)
MCH RBC QN AUTO: 35.3 PG (ref 27–31)
MCHC RBC AUTO-ENTMCNC: 34 G/DL (ref 32–36)
MCV RBC AUTO: 104 FL (ref 82–98)
MICROSCOPIC COMMENT: NORMAL
MONOCYTES # BLD AUTO: 0.9 K/UL (ref 0.3–1)
MONOCYTES NFR BLD: 10.1 % (ref 4–15)
NEUTROPHILS # BLD AUTO: 6.3 K/UL (ref 1.8–7.7)
NEUTROPHILS NFR BLD: 67.7 % (ref 38–73)
NITRITE UR QL STRIP: NEGATIVE
NRBC BLD-RTO: 0 /100 WBC
PH UR STRIP: 6 [PH] (ref 5–8)
PLATELET # BLD AUTO: 227 K/UL (ref 150–450)
PLATELET BLD QL SMEAR: ABNORMAL
PMV BLD AUTO: 11.5 FL (ref 9.2–12.9)
POTASSIUM SERPL-SCNC: 4.8 MMOL/L (ref 3.5–5.1)
PROT SERPL-MCNC: 7.5 G/DL (ref 6–8.4)
PROT UR QL STRIP: NEGATIVE
RBC # BLD AUTO: 4.3 M/UL (ref 4.6–6.2)
RBC #/AREA URNS HPF: 2 /HPF (ref 0–4)
SODIUM SERPL-SCNC: 137 MMOL/L (ref 136–145)
SP GR UR STRIP: 1 (ref 1–1.03)
SPECIMEN OUTDATE: NORMAL
URN SPEC COLLECT METH UR: ABNORMAL
UROBILINOGEN UR STRIP-ACNC: NEGATIVE EU/DL
WBC # BLD AUTO: 9.28 K/UL (ref 3.9–12.7)
WBC #/AREA URNS HPF: 0 /HPF (ref 0–5)

## 2024-04-12 PROCEDURE — 85027 COMPLETE CBC AUTOMATED: CPT | Performed by: ORTHOPAEDIC SURGERY

## 2024-04-12 PROCEDURE — 86850 RBC ANTIBODY SCREEN: CPT | Performed by: ORTHOPAEDIC SURGERY

## 2024-04-12 PROCEDURE — 80053 COMPREHEN METABOLIC PANEL: CPT | Performed by: ORTHOPAEDIC SURGERY

## 2024-04-12 PROCEDURE — 81000 URINALYSIS NONAUTO W/SCOPE: CPT | Performed by: ORTHOPAEDIC SURGERY

## 2024-04-12 PROCEDURE — 36415 COLL VENOUS BLD VENIPUNCTURE: CPT | Performed by: ORTHOPAEDIC SURGERY

## 2024-04-12 PROCEDURE — 85007 BL SMEAR W/DIFF WBC COUNT: CPT | Performed by: ORTHOPAEDIC SURGERY

## 2024-04-12 RX ORDER — ACETAMINOPHEN 500 MG
1000 TABLET ORAL
Status: CANCELLED | OUTPATIENT
Start: 2024-04-22 | End: 2024-04-22

## 2024-04-12 RX ORDER — ACETAMINOPHEN 500 MG
1000 TABLET ORAL
Status: CANCELLED | OUTPATIENT
Start: 2024-04-12 | End: 2024-04-12

## 2024-04-12 RX ORDER — SODIUM CHLORIDE, SODIUM LACTATE, POTASSIUM CHLORIDE, CALCIUM CHLORIDE 600; 310; 30; 20 MG/100ML; MG/100ML; MG/100ML; MG/100ML
INJECTION, SOLUTION INTRAVENOUS CONTINUOUS
Status: CANCELLED | OUTPATIENT
Start: 2024-04-12

## 2024-04-12 RX ORDER — LIDOCAINE HYDROCHLORIDE 10 MG/ML
0.5 INJECTION, SOLUTION EPIDURAL; INFILTRATION; INTRACAUDAL; PERINEURAL ONCE
Status: CANCELLED | OUTPATIENT
Start: 2024-04-12 | End: 2024-04-12

## 2024-04-12 NOTE — DISCHARGE INSTRUCTIONS
Information to Prepare you for your Surgery    PRE-ADMIT TESTING -  [phone]    [address]  [facility]          Your surgery has been scheduled at [facility]. We are pleased to have the opportunity to serve you. For Further Information please call [phone].    On the day of surgery please report to the Information Desk on the 1st floor.    CONTACT YOUR PHYSICIAN'S OFFICE THE DAY PRIOR TO YOUR SURGERY TO OBTAIN YOUR ARRIVAL TIME.     The evening before surgery do not eat anything after 9 p.m. ( this includes hard candy, chewing gum and mints).  You may only have GATORADE, POWERADE AND WATER  from 9 p.m. until you leave your home.   DO NOT DRINK ANY LIQUIDS ON THE WAY TO THE HOSPITAL.      Why does your anesthesiologist allow you to drink Gatorade/Powerade before surgery?  Gatorade/Powerade helps to increase your comfort before surgery and to decrease your nausea after surgery. The carbohydrates in Gatorade/Powerade help reduce your body's stress response to surgery.  If you are a diabetic-drink only water prior to surgery.    Outpatient Surgery- May allow 2 adult (18 and older) Support Persons (1 being the designated ) for all surgical/procedural patients. A breastfeeding mother will be allowed her infant and 2 adult Support Persons. No one under the age of 18 will be allowed in the building. No swapping out of visitors in the [facility].      SPECIAL MEDICATION INSTRUCTIONS: TAKE medications checked off by the Anesthesiologist on your Medication List.    Angiogram Patients: Take medications as instructed by your physician, including aspirin.     Surgery Patients:    If you take ASPIRIN - Your PHYSICIAN/SURGEON will need to inform you IF/OR when you need to stop taking aspirin prior to your surgery.     The week prior to surgery do not ot take any medications containing IBUPROFEN or NSAIDS ( Advil, Motrin, Goodys, BC, Aleve, Naproxen etc)  If you are not sure if you should take a medicine please call your surgeon's office.  Ok to take Tylenol    Do Not Wear any make-up (especially eye make-up) to surgery. Please remove any false eyelashes or eyelash extensions. If you arrive the day of surgery with makeup/eyelashes on you will be required to remove prior to surgery. (There is a risk of corneal abrasions if eye makeup/eyelash extensions are not removed)      Leave all valuables at home.   Do Not wear any jewelry or watches, including any metal in body piercings. Jewelry must be removed prior to coming to the hospital.  There is a possibility that rings that are unable to be removed may be cut off if they are on the surgical extremity.    Please remove all hair extensions, wigs, clips and any other metal accessories/ ornaments from your hair.  These items may pose a flammable/fire risk in Surgery and must be removed.    Do not shave your surgical area at least 5 days prior to your surgery. The surgical prep will be performed at the hospital according to Infection Control regulations.    Contact Lens must be removed before surgery. Either do not wear the contact lens or bring a case and solution for storage.  Please bring a container for eyeglasses or dentures as required.  Bring any paperwork your physician has provided, such as consent forms,  history and physicals, doctor's orders, etc.   Bring comfortable clothes that are loose fitting to wear upon discharge. Take into consideration the type of surgery being performed.  Maintain your diet as advised per your physician the day prior to surgery.      Adequate rest the night before surgery is advised.   Park in the Parking lot behind the hospital or in the [facility] across the street from the parking lot. Parking is complimentary.  If you will be discharged the same day as your procedure, please arrange for a responsible adult to drive you home or to accompany you if traveling by taxi.    YOU WILL NOT BE PERMITTED TO DRIVE OR TO LEAVE THE HOSPITAL ALONE AFTER SURGERY.   If you are being discharged the same day, it is strongly recommended that you arrange for someone to remain with you for the first 24 hrs following your surgery.    The Surgeon will speak to your family/visitor after your surgery regarding the outcome of your surgery and post op care.  The Surgeon may speak to you after your surgery, but there is a possibility you may not remember the details.  Please check with your family members regarding the conversation with the Surgeon.    We strongly recommend whoever is bringing you home be present for discharge instructions.  This will ensure a thorough understanding for your post op home care.          Thank you for your cooperation.  The Staff of [facility].            Bathing Instructions with Hibiclens    Shower the evening before and morning of your procedure with Chlorhexidine (Hibiclens)  do not use Chlorhexidine on your face or genitals. Do not get in your eyes.  Wash your face with water and your regular face wash/soap  Use your regular shampoo  Apply Chlorhexidine (Hibiclens) directly on your skin or on a wet washcloth and wash gently. When showering: Move away from the shower stream when applying Chlorhexidine (Hibiclens) to avoid rinsing off too soon.  Rinse thoroughly with warm water  Do not dilute Chlorhexidine (Hibiclens)   Dry off as usual, do not use any deodorant, powder, body lotions, perfume, after shave or cologne.

## 2024-04-12 NOTE — ANESTHESIA PREPROCEDURE EVALUATION
04/12/2024  Miguel Sawant Jr. is a 84 y.o., male.      Pre-op Assessment    I have reviewed the Patient Summary Reports.     I have reviewed the Nursing Notes. I have reviewed the NPO Status.   I have reviewed the Medications.     Review of Systems  Anesthesia Hx:  No problems with previous Anesthesia             Denies Family Hx of Anesthesia complications.   Personal Hx of Anesthesia complications (Urinary Retention)                    Social:  Non-Smoker       Hematology/Oncology:  Hematology Normal   Oncology Normal                                   EENT/Dental:  EENT/Dental Normal           Cardiovascular:  Exercise tolerance: good   Hypertension Valvular problems/Murmurs (s/p percutaneous AVR), AI, AS  CAD    Dysrhythmias (s/p cardioversion) atrial fibrillation  CHF        On eliquis    Echo:  SUMMARY:    1. Normal left ventricular systolic function. LVEF of > 55%.    2. Bioprosthetic valve (TAVR) that is normally functioning.    3. 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    4. Mild aortic regurgitation.    5. Mildly elevated pulmonary artery pressure.    6. Normal central venous pressure.                            Pulmonary:  Pulmonary Normal                       Renal/:    BPH              Hepatic/GI:  Hepatic/GI Normal                 Musculoskeletal:  Musculoskeletal Normal                Neurological:  Neurology Normal                                      Endocrine:  Endocrine Normal            Dermatological:  Skin Normal    Psych:  Psychiatric Normal                    Physical Exam  General: Well nourished, Cooperative, Alert and Oriented    Airway:  Mallampati: II   Mouth Opening: Normal  TM Distance: Normal  Tongue: Normal  Neck ROM: Normal ROM    Dental:  Intact, Caps / Implants        Anesthesia Plan  Type of Anesthesia, risks & benefits  discussed:    Anesthesia Type: Spinal  Intra-op Monitoring Plan: Standard ASA Monitors  Post Op Pain Control Plan: multimodal analgesia  Informed Consent: Informed consent signed with the Patient and all parties understand the risks and agree with anesthesia plan.  All questions answered.   ASA Score: 3  Anesthesia Plan Notes: CBC, BMP, EKG  Pt will be off eliquis X 3 days  S/P AVR one year ago. Pre-op eval from Dr. Castro in paper chart    Ready For Surgery From Anesthesia Perspective.     .

## 2024-04-19 NOTE — PLAN OF CARE
LMSW contacted patient. Patient denies the use of HH. Patient has DME. Patient is agreeable to MD's preferred choice for HH. Patient choice pharmacy is bedside. Patient's PCP is correct. Patients' family will transport the patient home at discharge.     [facility] ([place])  Initial Discharge Assessment       Primary Care Provider: Earnest Sanford MD    Admission Diagnosis: Unilateral primary osteoarthritis, right knee [M17.11]    Admission Date: (Not on file)  Expected Discharge Date:     Transition of Care Barriers: (P) None    Payor: [facility] MCARE / Plan: [employer] MEDICARE ADVANTAGE / Product Type: Medicare Advantage /     Extended Emergency Contact Information  Primary Emergency Contact: AguileraJuanitanithya Sawant  Address: [address] [place] [address] [place] [address] [facility]  Home Phone: [phone]  Mobile Phone: [phone]  Relation: Sister    Discharge Plan A: (P) Home Health  Discharge Plan B: (P) Home Health      [facility] - [place] [address]  Phone: [phone] Fax: [phone]    [facility] - [place] [address]  Phone: [phone] Fax: [phone]    [facility]  [address]  [facility] [address]  Phone: [phone] Fax: [phone]    [facility] ([place]) - [place] - [address] [place] [address]  [address] [place] [address]  [place] [address]  Phone: [phone] Fax: [phone]    [facility] - [facility] [address]  [address]  [address]  [facility] [address]  Phone: [phone] Fax: [phone]      Initial Assessment (most recent)       Adult Discharge Assessment - 04/19/24 1450          Discharge Assessment    Assessment Type Discharge Planning Assessment (P)      Confirmed/corrected address, phone number  and insurance Yes (P)      Confirmed Demographics Correct on Facesheet (P)      Source of Information patient;health record (P)      People in Home child(luisito), adult (P)      Do you expect to return to your current living situation? Yes (P)      Do you have help at home or someone to help you manage your care at home? Yes (P)      Prior to hospitilization cognitive status: Alert/Oriented;No Deficits (P)      Current cognitive status: Alert/Oriented;No Deficits (P)      Equipment Currently Used at Home walker, rolling;bedside commode;cane, straight (P)      Readmission within 30 days? No (P)      Patient currently being followed by outpatient case management? No (P)      Do you currently have service(s) that help you manage your care at home? No (P)      Do you take prescription medications? Yes (P)      Do you have prescription coverage? Yes (P)      Do you have any problems affording any of your prescribed medications? No (P)      Is the patient taking medications as prescribed? yes (P)      How do you get to doctors appointments? family or friend will provide;car, drives self (P)      Are you on dialysis? No (P)      Do you take coumadin? No (P)      Discharge Plan A Home Health (P)      Discharge Plan B Home Health (P)      DME Needed Upon Discharge  none (P)      Discharge Plan discussed with: Patient (P)      Transition of Care Barriers None (P)

## 2024-04-22 ENCOUNTER — ANESTHESIA (OUTPATIENT)
Dept: SURGERY | Facility: OTHER | Age: 84
End: 2024-04-22
Payer: MEDICARE

## 2024-04-22 ENCOUNTER — HOSPITAL ENCOUNTER (OUTPATIENT)
Facility: OTHER | Age: 84
Discharge: HOME HEALTH | End: 2024-04-23
Attending: ORTHOPAEDIC SURGERY | Admitting: ORTHOPAEDIC SURGERY
Payer: MEDICARE

## 2024-04-22 DIAGNOSIS — M17.11 UNILATERAL PRIMARY OSTEOARTHRITIS, RIGHT KNEE: Primary | ICD-10-CM

## 2024-04-22 PROCEDURE — 63600175 PHARM REV CODE 636 W HCPCS: Performed by: ORTHOPAEDIC SURGERY

## 2024-04-22 PROCEDURE — 63600175 PHARM REV CODE 636 W HCPCS: Performed by: ANESTHESIOLOGY

## 2024-04-22 PROCEDURE — 25000003 PHARM REV CODE 250: Performed by: ANESTHESIOLOGY

## 2024-04-22 PROCEDURE — 97116 GAIT TRAINING THERAPY: CPT

## 2024-04-22 PROCEDURE — 97161 PT EVAL LOW COMPLEX 20 MIN: CPT

## 2024-04-22 PROCEDURE — 97165 OT EVAL LOW COMPLEX 30 MIN: CPT

## 2024-04-22 PROCEDURE — 71000039 HC RECOVERY, EACH ADD'L HOUR: Performed by: ORTHOPAEDIC SURGERY

## 2024-04-22 PROCEDURE — 63600175 PHARM REV CODE 636 W HCPCS: Performed by: STUDENT IN AN ORGANIZED HEALTH CARE EDUCATION/TRAINING PROGRAM

## 2024-04-22 PROCEDURE — C1776 JOINT DEVICE (IMPLANTABLE): HCPCS | Performed by: ORTHOPAEDIC SURGERY

## 2024-04-22 PROCEDURE — 63600175 PHARM REV CODE 636 W HCPCS

## 2024-04-22 PROCEDURE — D9220A PRA ANESTHESIA: Mod: ANES,,, | Performed by: ANESTHESIOLOGY

## 2024-04-22 PROCEDURE — 25000003 PHARM REV CODE 250: Performed by: ORTHOPAEDIC SURGERY

## 2024-04-22 PROCEDURE — 25000003 PHARM REV CODE 250

## 2024-04-22 PROCEDURE — 37000008 HC ANESTHESIA 1ST 15 MINUTES: Performed by: ORTHOPAEDIC SURGERY

## 2024-04-22 PROCEDURE — C9290 INJ, BUPIVACAINE LIPOSOME: HCPCS | Performed by: ORTHOPAEDIC SURGERY

## 2024-04-22 PROCEDURE — 36000710: Performed by: ORTHOPAEDIC SURGERY

## 2024-04-22 PROCEDURE — 94799 UNLISTED PULMONARY SVC/PX: CPT

## 2024-04-22 PROCEDURE — 36000711: Performed by: ORTHOPAEDIC SURGERY

## 2024-04-22 PROCEDURE — C1713 ANCHOR/SCREW BN/BN,TIS/BN: HCPCS | Performed by: ORTHOPAEDIC SURGERY

## 2024-04-22 PROCEDURE — 97530 THERAPEUTIC ACTIVITIES: CPT

## 2024-04-22 PROCEDURE — 37000009 HC ANESTHESIA EA ADD 15 MINS: Performed by: ORTHOPAEDIC SURGERY

## 2024-04-22 PROCEDURE — 25000003 PHARM REV CODE 250: Performed by: NURSE PRACTITIONER

## 2024-04-22 PROCEDURE — 94761 N-INVAS EAR/PLS OXIMETRY MLT: CPT

## 2024-04-22 PROCEDURE — 97110 THERAPEUTIC EXERCISES: CPT

## 2024-04-22 PROCEDURE — 71000033 HC RECOVERY, INTIAL HOUR: Performed by: ORTHOPAEDIC SURGERY

## 2024-04-22 PROCEDURE — 97535 SELF CARE MNGMENT TRAINING: CPT

## 2024-04-22 PROCEDURE — D9220A PRA ANESTHESIA: Mod: CRNA,,, | Performed by: STUDENT IN AN ORGANIZED HEALTH CARE EDUCATION/TRAINING PROGRAM

## 2024-04-22 PROCEDURE — 25000003 PHARM REV CODE 250: Performed by: STUDENT IN AN ORGANIZED HEALTH CARE EDUCATION/TRAINING PROGRAM

## 2024-04-22 PROCEDURE — 27201423 OPTIME MED/SURG SUP & DEVICES STERILE SUPPLY: Performed by: ORTHOPAEDIC SURGERY

## 2024-04-22 DEVICE — CEMENT REFOBACIN BCR 1X40: Type: IMPLANTABLE DEVICE | Site: KNEE | Status: FUNCTIONAL

## 2024-04-22 DEVICE — IMPLANTABLE DEVICE: Type: IMPLANTABLE DEVICE | Site: KNEE | Status: FUNCTIONAL

## 2024-04-22 DEVICE — IMPLANTABLE DEVICE
Type: IMPLANTABLE DEVICE | Site: KNEE | Status: FUNCTIONAL
Brand: PERSONA®

## 2024-04-22 DEVICE — TIBIA STM PSN 5 DEG SZ F R: Type: IMPLANTABLE DEVICE | Site: KNEE | Status: FUNCTIONAL

## 2024-04-22 RX ORDER — OXYCODONE HYDROCHLORIDE 5 MG/1
5 TABLET ORAL
Status: DISCONTINUED | OUTPATIENT
Start: 2024-04-22 | End: 2024-04-22 | Stop reason: HOSPADM

## 2024-04-22 RX ORDER — MEPERIDINE HYDROCHLORIDE 25 MG/ML
12.5 INJECTION INTRAMUSCULAR; INTRAVENOUS; SUBCUTANEOUS ONCE AS NEEDED
Status: DISCONTINUED | OUTPATIENT
Start: 2024-04-22 | End: 2024-04-22 | Stop reason: HOSPADM

## 2024-04-22 RX ORDER — FENTANYL CITRATE 50 UG/ML
INJECTION, SOLUTION INTRAMUSCULAR; INTRAVENOUS
Status: DISCONTINUED | OUTPATIENT
Start: 2024-04-22 | End: 2024-04-22

## 2024-04-22 RX ORDER — FAMOTIDINE 20 MG/1
20 TABLET, FILM COATED ORAL 2 TIMES DAILY
Status: DISCONTINUED | OUTPATIENT
Start: 2024-04-22 | End: 2024-04-23 | Stop reason: HOSPADM

## 2024-04-22 RX ORDER — EPHEDRINE SULFATE 50 MG/ML
INJECTION, SOLUTION INTRAVENOUS
Status: DISCONTINUED | OUTPATIENT
Start: 2024-04-22 | End: 2024-04-22

## 2024-04-22 RX ORDER — METOPROLOL SUCCINATE 25 MG/1
25 TABLET, EXTENDED RELEASE ORAL DAILY
Status: DISCONTINUED | OUTPATIENT
Start: 2024-04-23 | End: 2024-04-23 | Stop reason: HOSPADM

## 2024-04-22 RX ORDER — LISINOPRIL 5 MG/1
5 TABLET ORAL DAILY
Status: DISCONTINUED | OUTPATIENT
Start: 2024-04-22 | End: 2024-04-23 | Stop reason: HOSPADM

## 2024-04-22 RX ORDER — POLYETHYLENE GLYCOL 3350 17 G/17G
17 POWDER, FOR SOLUTION ORAL DAILY
Status: DISCONTINUED | OUTPATIENT
Start: 2024-04-23 | End: 2024-04-23 | Stop reason: HOSPADM

## 2024-04-22 RX ORDER — BUPIVACAINE HCL/EPINEPHRINE 0.25-.0005
VIAL (ML) INJECTION
Status: DISCONTINUED | OUTPATIENT
Start: 2024-04-22 | End: 2024-04-22 | Stop reason: HOSPADM

## 2024-04-22 RX ORDER — SODIUM CHLORIDE 0.9 % (FLUSH) 0.9 %
3 SYRINGE (ML) INJECTION EVERY 6 HOURS PRN
Status: DISCONTINUED | OUTPATIENT
Start: 2024-04-22 | End: 2024-04-23 | Stop reason: HOSPADM

## 2024-04-22 RX ORDER — METOCLOPRAMIDE HYDROCHLORIDE 5 MG/ML
5 INJECTION INTRAMUSCULAR; INTRAVENOUS EVERY 6 HOURS PRN
Status: DISCONTINUED | OUTPATIENT
Start: 2024-04-22 | End: 2024-04-23 | Stop reason: HOSPADM

## 2024-04-22 RX ORDER — ATORVASTATIN CALCIUM 20 MG/1
20 TABLET, FILM COATED ORAL DAILY
Status: DISCONTINUED | OUTPATIENT
Start: 2024-04-23 | End: 2024-04-23 | Stop reason: HOSPADM

## 2024-04-22 RX ORDER — AMIODARONE HYDROCHLORIDE 100 MG/1
100 TABLET ORAL DAILY
Status: DISCONTINUED | OUTPATIENT
Start: 2024-04-23 | End: 2024-04-23 | Stop reason: HOSPADM

## 2024-04-22 RX ORDER — KETOROLAC TROMETHAMINE 30 MG/ML
INJECTION, SOLUTION INTRAMUSCULAR; INTRAVENOUS
Status: DISCONTINUED | OUTPATIENT
Start: 2024-04-22 | End: 2024-04-22 | Stop reason: HOSPADM

## 2024-04-22 RX ORDER — MUPIROCIN 20 MG/G
OINTMENT TOPICAL 2 TIMES DAILY
Status: DISCONTINUED | OUTPATIENT
Start: 2024-04-22 | End: 2024-04-23 | Stop reason: HOSPADM

## 2024-04-22 RX ORDER — SODIUM CHLORIDE 0.9 % (FLUSH) 0.9 %
3 SYRINGE (ML) INJECTION
Status: DISCONTINUED | OUTPATIENT
Start: 2024-04-22 | End: 2024-04-22 | Stop reason: HOSPADM

## 2024-04-22 RX ORDER — HYDROCODONE BITARTRATE AND ACETAMINOPHEN 5; 325 MG/1; MG/1
1 TABLET ORAL EVERY 4 HOURS PRN
Status: DISCONTINUED | OUTPATIENT
Start: 2024-04-22 | End: 2024-04-23 | Stop reason: HOSPADM

## 2024-04-22 RX ORDER — PROCHLORPERAZINE EDISYLATE 5 MG/ML
5 INJECTION INTRAMUSCULAR; INTRAVENOUS EVERY 30 MIN PRN
Status: DISCONTINUED | OUTPATIENT
Start: 2024-04-22 | End: 2024-04-22 | Stop reason: HOSPADM

## 2024-04-22 RX ORDER — HYDROMORPHONE HYDROCHLORIDE 2 MG/ML
0.4 INJECTION, SOLUTION INTRAMUSCULAR; INTRAVENOUS; SUBCUTANEOUS EVERY 5 MIN PRN
Status: DISCONTINUED | OUTPATIENT
Start: 2024-04-22 | End: 2024-04-22 | Stop reason: HOSPADM

## 2024-04-22 RX ORDER — LIDOCAINE HYDROCHLORIDE 20 MG/ML
INJECTION INTRAVENOUS
Status: DISCONTINUED | OUTPATIENT
Start: 2024-04-22 | End: 2024-04-22

## 2024-04-22 RX ORDER — CEFAZOLIN SODIUM 1 G/3ML
2 INJECTION, POWDER, FOR SOLUTION INTRAMUSCULAR; INTRAVENOUS
Status: COMPLETED | OUTPATIENT
Start: 2024-04-22 | End: 2024-04-22

## 2024-04-22 RX ORDER — DIPHENHYDRAMINE HYDROCHLORIDE 50 MG/ML
25 INJECTION INTRAMUSCULAR; INTRAVENOUS EVERY 6 HOURS PRN
Status: DISCONTINUED | OUTPATIENT
Start: 2024-04-22 | End: 2024-04-23 | Stop reason: HOSPADM

## 2024-04-22 RX ORDER — ACETAMINOPHEN 500 MG
1000 TABLET ORAL EVERY 8 HOURS
Status: COMPLETED | OUTPATIENT
Start: 2024-04-22 | End: 2024-04-23

## 2024-04-22 RX ORDER — ONDANSETRON HYDROCHLORIDE 2 MG/ML
INJECTION, SOLUTION INTRAVENOUS
Status: DISCONTINUED | OUTPATIENT
Start: 2024-04-22 | End: 2024-04-22

## 2024-04-22 RX ORDER — SPIRONOLACTONE 25 MG/1
25 TABLET ORAL DAILY
Status: DISCONTINUED | OUTPATIENT
Start: 2024-04-23 | End: 2024-04-23

## 2024-04-22 RX ORDER — PROPOFOL 10 MG/ML
VIAL (ML) INTRAVENOUS
Status: DISCONTINUED | OUTPATIENT
Start: 2024-04-22 | End: 2024-04-22

## 2024-04-22 RX ORDER — LIDOCAINE HYDROCHLORIDE 10 MG/ML
0.5 INJECTION, SOLUTION EPIDURAL; INFILTRATION; INTRACAUDAL; PERINEURAL ONCE
Status: DISCONTINUED | OUTPATIENT
Start: 2024-04-22 | End: 2024-04-22 | Stop reason: HOSPADM

## 2024-04-22 RX ORDER — CELECOXIB 200 MG/1
200 CAPSULE ORAL
Status: DISCONTINUED | OUTPATIENT
Start: 2024-04-22 | End: 2024-04-23

## 2024-04-22 RX ORDER — ROPIVACAINE HYDROCHLORIDE 5 MG/ML
INJECTION, SOLUTION EPIDURAL; INFILTRATION; PERINEURAL
Status: COMPLETED | OUTPATIENT
Start: 2024-04-22 | End: 2024-04-22

## 2024-04-22 RX ORDER — HYDROMORPHONE HYDROCHLORIDE 1 MG/ML
0.5 INJECTION, SOLUTION INTRAMUSCULAR; INTRAVENOUS; SUBCUTANEOUS EVERY 4 HOURS PRN
Status: DISCONTINUED | OUTPATIENT
Start: 2024-04-22 | End: 2024-04-22

## 2024-04-22 RX ORDER — ONDANSETRON HYDROCHLORIDE 2 MG/ML
8 INJECTION, SOLUTION INTRAVENOUS EVERY 8 HOURS PRN
Status: DISCONTINUED | OUTPATIENT
Start: 2024-04-22 | End: 2024-04-23 | Stop reason: HOSPADM

## 2024-04-22 RX ORDER — DOCUSATE SODIUM 100 MG/1
100 CAPSULE, LIQUID FILLED ORAL EVERY 12 HOURS
Status: DISCONTINUED | OUTPATIENT
Start: 2024-04-22 | End: 2024-04-23 | Stop reason: HOSPADM

## 2024-04-22 RX ORDER — SODIUM CHLORIDE, SODIUM LACTATE, POTASSIUM CHLORIDE, CALCIUM CHLORIDE 600; 310; 30; 20 MG/100ML; MG/100ML; MG/100ML; MG/100ML
INJECTION, SOLUTION INTRAVENOUS CONTINUOUS
Status: DISCONTINUED | OUTPATIENT
Start: 2024-04-22 | End: 2024-04-22

## 2024-04-22 RX ORDER — PROPOFOL 10 MG/ML
VIAL (ML) INTRAVENOUS CONTINUOUS PRN
Status: DISCONTINUED | OUTPATIENT
Start: 2024-04-22 | End: 2024-04-22

## 2024-04-22 RX ORDER — ACETAMINOPHEN 500 MG
1000 TABLET ORAL
Status: COMPLETED | OUTPATIENT
Start: 2024-04-22 | End: 2024-04-22

## 2024-04-22 RX ORDER — DEXTROSE MONOHYDRATE AND SODIUM CHLORIDE 5; .9 G/100ML; G/100ML
INJECTION, SOLUTION INTRAVENOUS CONTINUOUS
Status: DISCONTINUED | OUTPATIENT
Start: 2024-04-22 | End: 2024-04-23 | Stop reason: HOSPADM

## 2024-04-22 RX ORDER — TRANEXAMIC ACID 100 MG/ML
INJECTION, SOLUTION INTRAVENOUS
Status: DISCONTINUED | OUTPATIENT
Start: 2024-04-22 | End: 2024-04-22

## 2024-04-22 RX ORDER — HYDROCODONE BITARTRATE AND ACETAMINOPHEN 10; 325 MG/1; MG/1
1 TABLET ORAL EVERY 4 HOURS PRN
Status: ACTIVE | OUTPATIENT
Start: 2024-04-22 | End: 2024-04-23

## 2024-04-22 RX ADMIN — FENTANYL CITRATE 100 MCG: 50 INJECTION, SOLUTION INTRAMUSCULAR; INTRAVENOUS at 08:04

## 2024-04-22 RX ADMIN — VANCOMYCIN HYDROCHLORIDE 1250 MG: 1.25 INJECTION, POWDER, LYOPHILIZED, FOR SOLUTION INTRAVENOUS at 07:04

## 2024-04-22 RX ADMIN — EPHEDRINE SULFATE 10 MG: 50 INJECTION INTRAVENOUS at 09:04

## 2024-04-22 RX ADMIN — EPHEDRINE SULFATE 10 MG: 50 INJECTION INTRAVENOUS at 08:04

## 2024-04-22 RX ADMIN — LISINOPRIL 5 MG: 5 TABLET ORAL at 01:04

## 2024-04-22 RX ADMIN — CEFAZOLIN 2 G: 2 INJECTION, POWDER, FOR SOLUTION INTRAMUSCULAR; INTRAVENOUS at 04:04

## 2024-04-22 RX ADMIN — CEFAZOLIN 2 G: 2 INJECTION, POWDER, FOR SOLUTION INTRAMUSCULAR; INTRAVENOUS at 11:04

## 2024-04-22 RX ADMIN — SODIUM CHLORIDE, SODIUM LACTATE, POTASSIUM CHLORIDE, AND CALCIUM CHLORIDE: 600; 310; 30; 20 INJECTION, SOLUTION INTRAVENOUS at 07:04

## 2024-04-22 RX ADMIN — MUPIROCIN: 20 OINTMENT TOPICAL at 01:04

## 2024-04-22 RX ADMIN — PROPOFOL 100 MCG/KG/MIN: 10 INJECTION, EMULSION INTRAVENOUS at 08:04

## 2024-04-22 RX ADMIN — CELECOXIB 200 MG: 200 CAPSULE ORAL at 07:04

## 2024-04-22 RX ADMIN — MUPIROCIN: 20 OINTMENT TOPICAL at 09:04

## 2024-04-22 RX ADMIN — ONDANSETRON HYDROCHLORIDE 4 MG: 2 INJECTION INTRAMUSCULAR; INTRAVENOUS at 08:04

## 2024-04-22 RX ADMIN — FAMOTIDINE 20 MG: 20 TABLET ORAL at 01:04

## 2024-04-22 RX ADMIN — DOCUSATE SODIUM 100 MG: 100 CAPSULE, LIQUID FILLED ORAL at 01:04

## 2024-04-22 RX ADMIN — PROPOFOL 30 MG: 10 INJECTION, EMULSION INTRAVENOUS at 08:04

## 2024-04-22 RX ADMIN — OXYCODONE 5 MG: 5 TABLET ORAL at 10:04

## 2024-04-22 RX ADMIN — LIDOCAINE HYDROCHLORIDE 50 MG: 20 INJECTION, SOLUTION INTRAVENOUS at 08:04

## 2024-04-22 RX ADMIN — ACETAMINOPHEN 1000 MG: 500 TABLET ORAL at 07:04

## 2024-04-22 RX ADMIN — DOCUSATE SODIUM 100 MG: 100 CAPSULE, LIQUID FILLED ORAL at 09:04

## 2024-04-22 RX ADMIN — DEXTROSE AND SODIUM CHLORIDE: 5; 900 INJECTION, SOLUTION INTRAVENOUS at 01:04

## 2024-04-22 RX ADMIN — FAMOTIDINE 20 MG: 20 TABLET ORAL at 09:04

## 2024-04-22 RX ADMIN — TRANEXAMIC ACID 2000 MG: 100 INJECTION, SOLUTION INTRAVENOUS at 08:04

## 2024-04-22 RX ADMIN — ACETAMINOPHEN 1000 MG: 500 TABLET ORAL at 09:04

## 2024-04-22 RX ADMIN — ROPIVACAINE HYDROCHLORIDE 3 ML: 5 INJECTION, SOLUTION EPIDURAL; INFILTRATION; PERINEURAL at 08:04

## 2024-04-22 RX ADMIN — ACETAMINOPHEN 1000 MG: 500 TABLET ORAL at 01:04

## 2024-04-22 RX ADMIN — CELECOXIB 200 MG: 200 CAPSULE ORAL at 01:04

## 2024-04-22 RX ADMIN — CEFAZOLIN 2 G: 330 INJECTION, POWDER, FOR SOLUTION INTRAMUSCULAR; INTRAVENOUS at 08:04

## 2024-04-22 RX ADMIN — SODIUM CHLORIDE, SODIUM LACTATE, POTASSIUM CHLORIDE, AND CALCIUM CHLORIDE: 600; 310; 30; 20 INJECTION, SOLUTION INTRAVENOUS at 08:04

## 2024-04-22 NOTE — PLAN OF CARE
Problem: Physical Therapy  Goal: Physical Therapy Goal  Description: Goals to be met by: 24     Patient will increase functional independence with mobility by performin. Supine to sit with supervision.   2. Sit to supine with supervision.   3. Sit<>stand transfer with supervision using rolling walker.   4. Gait > 150 feet with SBA using rolling walker.   5. Ascend/descend 3 stairs with L handrails with CGA and LR AD.  Outcome: Ongoing, Progressing     Patient evaluated by PT and goals established. Patient with no pain during therapy session. AAROM knee flexion ROM 0-95. Bed mobility with SBA, sit<>stand with CGA with RW, and amb x 100 ft with RW and CGA.  PT will continue to follow and progress as tolerated. Rec for d/c to home with LIT. Please see progress note for detailed plan of care and recommendations.

## 2024-04-22 NOTE — OR NURSING
Pt continues without c/o pain at this time. No change from previous assessment. Xray done at bedside. ANABELA Lee NP at bedside to see pt. Report called to [facility]. Pt placed on transport. Misha Huang updated by phone and sent to pt room.

## 2024-04-22 NOTE — TRANSFER OF CARE
"Anesthesia Transfer of Care Note    Patient: Miguel Sawant JrAnika    Procedure(s) Performed: Procedure(s) (LRB):  ARTHROPLASTY, KNEE, TOTAL (Right)    Patient location: PACU    Anesthesia Type: spinal    Transport from OR: Transported from OR on 2-3 L/min O2 by NC with adequate spontaneous ventilation    Post pain: adequate analgesia    Post assessment: no apparent anesthetic complications and tolerated procedure well    Post vital signs: stable    Level of consciousness: alert and awake    Nausea/Vomiting: no nausea/vomiting    Complications: none    Transfer of care protocol was followed      Last vitals: Visit Vitals  /60 (BP Location: Right arm, Patient Position: Lying)   Pulse 60   Temp 36.4 °C (97.5 °F) (Temporal)   Resp 16   Ht 5' 11" (1.803 m)   Wt 90.7 kg (200 lb)   SpO2 (!) 94%   BMI 27.89 kg/m²     "

## 2024-04-22 NOTE — OP NOTE
[facility]  Operative Report    SUMMARY     Surgery Date: 4/22/2024     Surgeons and Role:     * Wade Soriano MD - Primary    Assistant: ISAIAH Nazario    Pre-op Diagnosis:  Unilateral primary osteoarthritis, right knee [M17.11]    Post-op Diagnosis:  Post-Op Diagnosis Codes:     * Unilateral primary osteoarthritis, right knee [M17.11]    Procedure(s) (LRB):  ARTHROPLASTY, KNEE, TOTAL (Right) ([facility])    Anesthesia: Spinal    Description of Procedure: The appropriate consent was signed. The patient understood and except all risks and complications. The patient was brought to the Operating Room after undergoing spinal anesthetic. Tourniquet was applied to the proximal operative leg. The lower extremity was then prepped and draped in a sterile manner. After exsanguination of Esmarch bandage, tourniquet was inflated to 300 mmHg. An anterior incision with a medial parapatellar arthrotomy was performed. The menisci, anterior cruciate ligament and patellar fat pad were excised. The patella was resurfaced and sized to a 35 mm patella.   Three holes were then drilled for the lugs and this was trialed. A hole was then  drilled in the distal femur, todd was placed down the femoral canal and the   distal femur cut in 6 degrees of valgus. The tibia was then cut  with the extramedullary device   in 0 degree varus valgus with 5 degrees in posterior   slope. Extension block was placed and full extension was noted. The femur was   then sized to a #8 and #8 cutting block was placed and the anterior and   posterior cuts as well as chamfer cuts were performed. The tibia was sized to a  size F and a trial was performed.Trials were performed and a 12 mm spacer was felt to be appropriate.The tibia was then prepared with the drill and the punch, and trials were   removed and the knee washed out. Aquamantys was used to paint the posterior   capsule and corners. Palacos cement with gentamicin was then mixed and    pressurized sequentially in tibia, femur and patella. Components were impacted   and excess cement was removed. A trial 12 mm spacer was placed and knee   brought out to full extension. Once the cement was allowed to harden, the 12 mm  spacer was felt to be appropriate and this was locked into the tibial   baseplate. Tourniquet was deflated and hemostasis was obtained. Good patellar   tracking was noted. Exparel cocktail was injected into the fascia and   subcutaneous tissue. The fascial closure was obtained with a running #2 Quill suture. Subcutaneous closure was obtained with #1 and 2-0 Vicryl. Skin was then closed with the staples and a sterile compressive dressing was applied. The patient was then brought to the Recovery Room in a good condition.        Estimated Blood Loss: 100cc         Specimens:   Specimen (24h ago, onward)      None

## 2024-04-22 NOTE — PLAN OF CARE
I certify I provided patient choice and a list to the patient/family of [facility].  Patient/Family signed Patient's Choice Disclosure Form choosing the following: Md's preferred choice   [facility] stated they can service patient.

## 2024-04-22 NOTE — PT/OT/SLP EVAL
Physical Therapy Evaluation and Treatment    Patient Name:  Miguel Sawant Jr.   MRN:  [id number]    Recommendations:     Discharge Recommendations: Low Intensity Therapy   Discharge Equipment Recommendations: none   Barriers to discharge: Inaccessible home    Assessment:     Miguel Sawant Jr. is a 84 y.o. male admitted with a medical diagnosis of Unilateral primary osteoarthritis, right knee. Pmhx pertinent for L TKA (15 years ago), HTN. He presents with the following impairments/functional limitations: weakness, impaired self care skills, impaired functional mobility, gait instability, decreased coordination, decreased lower extremity function, decreased ROM, impaired skin, edema, orthopedic precautions.    Patient evaluated by PT and goals established. Patient with no pain during therapy session. AAROM knee flexion ROM 0-95. Bed mobility with SBA, sit<>stand with CGA with RW, and amb x 100 ft with RW and CGA. Split session with PT return progressing gait distance with no change in pain. PT will continue to follow and progress as tolerated. Rec for d/c to home with LIT.     Rehab Prognosis: Good; patient would benefit from acute skilled PT services to address these deficits and reach maximum level of function.    Recent Surgery: Procedure(s) (LRB):  ARTHROPLASTY, KNEE, TOTAL (Right) Day of Surgery    Plan:     During this hospitalization, patient to be seen BID to address the identified rehab impairments via gait training, therapeutic activities, therapeutic exercises and progress toward the following goals:    Plan of Care Expires:  04/29/24    Subjective     Chief Complaint: Denies pain  Patient/Family Comments/goals: Goal to walk without pain, previously swam competitively and for fun but d/t shoulder injuries can't do laps easily, happy he's able to urinate; Patient agreeable to evaluation.  Pain/Comfort:  Pain Rating 1: 0/10  Location - Side 1: Right  Location 1: knee  Pain Addressed 1: Reposition,  Distraction, Cessation of Activity  Pain Rating Post-Intervention 1: 0/10    Patients cultural, spiritual, Oriental orthodox conflicts given the current situation: no    Living Environment:  Pt lives in a single story home with 3 steps to enter and L handrail(s).   Upon discharge, patient will have assistance from his son, who will be staying with him.  Prior level of function:  Ambulation: Indep  ADL's: Indep  IADLs: Indep  Stays active  Falls: None reported  Equipment used at home: none (has RW, cane, BSC).      Objective:     Communicated with CHAPARRO Hearn prior to session.  Patient found HOB elevated with peripheral IV, telemetry, SCD, FCD, cryotherapy  upon PT entry to room.    General Precautions: Standard, fall  Orthopedic Precautions:RLE weight bearing as tolerated   Braces: N/A  Respiratory Status: Room air    Patient donned non slip socks and gait belt for OOB mobility.    Exams:  Cognition:   Patient is oriented to name, , date, place, situation.  Pt follows approximately 100% of one step commands.    Mood: Pleasant and cooperative.   Musculoskeletal:  Posture: Protective guarding surgical joint  LE ROM/Strength: 5/5 bilateral hip flexion, nonsurgical knee extension, bilateral ankle dorsiflexion. AROM surgical knee difficult to accurately assess with large bulky dressing, although knee flexion grossly 0>95 degrees. Surgical knee strength grossly 5/5 knee flexion and 3-/5 knee extension.  Neuromuscular:  Sensation: Intact to light touch bilateral LEs. Pt denied paresthesias.   Coordination/Tone/Reflexes: No impairments identified with functional mobility. No formal testing performed.   Balance: CGA for dynamic standing with bilateral UE support.   Visual-vestibular: No impairments identified with functional mobility. No formal testing performed.  Integument:  Visible skin intact and surgical extremity dressing clean and dry.   Cardiopulmonary:  Edema: Mild surgical extremity.    Color/temperature/pulses:  Equal    Functional Mobility:  Bed Mobility:     Supine to Sit: stand by assistance  Transfers:     Sit to Stand:  contact guard assistance with rolling walker  Demo of appropriate sequencing  Cueing for hand placement and eccentric control of descent  Good carryover of cueing noted  Gait: x100 ft with RW and CGA.   Initial step to gait with increased B stance time and decreased knee flexion during swing that mostly resolved with longer gait in hallway with PT facilitating self led learning.   Reinforced appropriate RW management and WBAT.  No overt knee buckling or LOB noted  Functional Mobility:  Transfers:     Sit to Stand:  stand by assistance with rolling walker  No cueing required for hand placement  Gait: x16 ft with RW and CGA, x140 ft with RW and SBA.   Regressed to step to gait pattern until in hallway then progressed to step thru.   Cueing for RW management with tendency to  RW before cueing.   Balance: Static standing at toilet x30 sec without UE support.       AM-PAC 6 CLICK MOBILITY  Total Score:18       Treatment & Education:  Pt performed supine therapeutic exercises including ankle pumps, quad sets, glute sets, SLR, SAQs, heel slides, abd/add x 10 reps with verbal and tactile cues.   Slight extensor lag  Follow up: Pt performed supine therapeutic exercises including ankle pumps, quad sets, glute sets, SLR, heel slides, and seated hip flexion and LAQs x 10 reps with verbal and tactile cues.  Gait and transfers as above  PT educated patient and son re:   PT plan of care/role of PT  Safety with OOB mobility  Use of RW  Positioning of LE and use of ice  Orthopedic precautions  Gait deviations  Therapeutic exercises  Discharge disposition    Pt and son verbalized understanding       Patient left up in chair with all lines intact, call button in reach, RN notified, ortho bernie and son present, and white board updated ice applied and LE positoned with towel roll to encourage extension of knee and  neutral rotation of hip.    GOALS:   Multidisciplinary Problems       Physical Therapy Goals          Problem: Physical Therapy    Goal Priority Disciplines Outcome Goal Variances Interventions   Physical Therapy Goal     PT, PT/OT Ongoing, Progressing     Description: Goals to be met by: 24     Patient will increase functional independence with mobility by performin. Supine to sit with supervision.   2. Sit to supine with supervision.   3. Sit<>stand transfer with supervision using rolling walker.   4. Gait > 150 feet with SBA using rolling walker.   5. Ascend/descend 3 stairs with L handrails with CGA and LR AD.                       History:     Past Medical History:   Diagnosis Date    Blood transfusion     Heart murmur     Hypertension     S/P TAVR (transcatheter aortic valve replacement)        Past Surgical History:   Procedure Laterality Date    JOINT REPLACEMENT      left    KNEE ARTHROSCOPY      LEFT HEART CATHETERIZATION Right 2021    Procedure: CATHETERIZATION, HEART, LEFT LV MEGHNA POSSIBLE;  Surgeon: Paco Queen MD;  Location: [facility] CATH LAB;  Service: Cardiology;  Laterality: Right;    RIGHT HEART CATHETERIZATION Right 2021    Procedure: INSERTION, CATHETER, RIGHT HEART LV MEGHNA POSSIBLE;  Surgeon: Paco Queen MD;  Location: [facility] CATH LAB;  Service: Cardiology;  Laterality: Right;    TONSILLECTOMY         Time Tracking:     PT Received On: 24  PT Start Time: 1221     PT Stop Time: 1245  PT Start Time: 1420    PT Stop Time: 1439  PT Total Time (min): 24 min + 19 min = 43    Billable Minutes: Evaluation 5, Gait Training 15, Therapeutic Activity 8, and Therapeutic Exercise 15      2024

## 2024-04-22 NOTE — CONSULTS
Consult Note  MED    Consult Requested By: Wade Soriano MD  Reason for Consult: HTN/TAVR/CHF/Lipids    SUBJECTIVE:     History of Present Illness:  Patient is a 84 y.o. male presents with scheduled right knee repair.  Seen post op in PACU.  VSS.  Pre-op/EPIC reviewed.  No CP/SOB/N/V/D/F/C.      Past Medical History:   Diagnosis Date    Blood transfusion     Heart murmur     Hypertension     S/P TAVR (transcatheter aortic valve replacement)      Past Surgical History:   Procedure Laterality Date    JOINT REPLACEMENT      left    KNEE ARTHROSCOPY      LEFT HEART CATHETERIZATION Right 7/30/2021    Procedure: CATHETERIZATION, HEART, LEFT LV MEGHNA POSSIBLE;  Surgeon: Paco Queen MD;  Location: [facility] CATH LAB;  Service: Cardiology;  Laterality: Right;    RIGHT HEART CATHETERIZATION Right 7/30/2021    Procedure: INSERTION, CATHETER, RIGHT HEART LV MEGHNA POSSIBLE;  Surgeon: Paco Queen MD;  Location: [facility] CATH LAB;  Service: Cardiology;  Laterality: Right;    TONSILLECTOMY       Family History   Problem Relation Name Age of Onset    Cancer Father      COPD Father       Social History     Tobacco Use    Smoking status: Never    Smokeless tobacco: Never   Substance Use Topics    Alcohol use: Yes    Drug use: No       Review of patient's allergies indicates:  No Known Allergies     Review of Systems:  Constitutional: No fever or chills  Respiratory: No cough or shortness of breath  Cardiovascular: No chest pain or palpitations  Gastrointestinal: No nausea or vomiting  Neurological: No confusion or weakness    OBJECTIVE:     Vital Signs (Most Recent)  Temp: 97.8 °F (36.6 °C) (04/22/24 1021)  Pulse: (!) 56 (04/22/24 1021)  Resp: 16 (04/22/24 1029)  BP: 134/63 (04/22/24 1021)  SpO2: 100 % (04/22/24 1021)    Vital Signs Range (Last 24H):  Temp:  [97.5 °F (36.4 °C)-97.8 °F (36.6 °C)]   Pulse:  [54-65]   Resp:  [16]   BP: (129-145)/(60-77)   SpO2:  [94 %-100 %]       Intake/Output Summary (Last 24 hours) at  "4/22/2024 1030  Last data filed at 4/22/2024 1020  Gross per 24 hour   Intake 1300 ml   Output 0 ml   Net 1300 ml       Physical Exam:  General appearance: Well developed, well nourished  Eyes:  Conjunctivae/corneas clear. PERRL.  Lungs: Normal respiratory effort,   clear to auscultation bilaterally   Heart: Regular rate and rhythm, S1, S2 normal, 2/6 murmur, rub or yani.  Abdomen: Soft, non-tender non-distended; bowel sounds normal; no masses,  no organomegaly  Extremities: No cyanosis or clubbing. No edema.    Skin: Skin color, texture, turgor normal. No rashes or lesions  Neurologic: Normal strength and tone. No focal numbness or weakness   Right Knee CDI      Laboratory:  No results for input(s): "WBC", "RBC", "HGB", "HCT", "PLT", "MCV", "MCH", "MCHC" in the last 24 hours.  BMP: No results for input(s): "GLU", "NA", "K", "CL", "CO2", "BUN", "CREATININE", "CALCIUM", "MG", "PHOS" in the last 168 hours.  Lab Results   Component Value Date    CALCIUM 10.5 04/12/2024     BNP  No results for input(s): "BNP", "BNPTRIAGEBLO" in the last 168 hours.No results found for: "URICACID"No results found for: "IRON", "TIBC", "FERRITIN", "SATURATEDIRO"  Lab Results   Component Value Date    CALCIUM 10.5 04/12/2024       Diagnostic Results:  X-Ray Knee 1 or 2 View Right    (Results Pending)       ASSESSMENT/PLAN:     Right Knee:  per ortho/therapy teams.      TAVR/Fib:  Cleared by Dr. Queen.  Place on tele.  Low threshold to consult.      HTN:  substitute meds.  Hold lasix but cont valerie tomorrow.  Hold parameters.    Lipids:  statin.    DVT:  resume eliquis tomorrow.       Thanks for consult  See above  Will follow along.       High MDM complexity necessary to treat or prevent imminent or life-threatening deterioration of the following conditions: TAVR/HTN/Lipids  Time spent personally by me on the following activities 50 min: development of treatment plan with patient or surrogate, discussions with consultants, " interpretation of cardiac output measurements, examination of patient, ordering and performing treatments and interventions, evaluation of patient's response to treatment, obtaining history from patient or surrogate, ordering and review of laboratory studies, ordering and review of radiographic studies, re-evaluation of patient's condition, pulse oximetry and blood draw for specimens    I evaluated the patient with the NP and was present during the history and performed the physical examination above.  I formulated all medical decision making.  Although the note was started by the NP, I have taken responsibility for the plan and have signed this note. I was present for more that 50% of total time: 60min

## 2024-04-22 NOTE — PLAN OF CARE
Problem: Occupational Therapy  Goal: Occupational Therapy Goal  Description: Goals to be met by: 4/25/24     Patient will increase functional independence with ADLs by performing:    Demonstrate understanding of TKA precautions and how to adhere to precautions during LB self care tasks.  Demonstrate understanding of TKA precautions and how to adhere to precautions during ADL mobility and transfers using RW.  Toileting at SBA.   Toilet transfers at SBA.  Grooming standing at sink at SBA.  Donning underwear/pants/shorts at SBA.   Donning socks at Set up/SBA.  Donning shoes at Set up/SBA.    Outcome: Ongoing, Progressing   Recommend 24/7 assist/supervision from son and Low Intensity Therapy.

## 2024-04-22 NOTE — PT/OT/SLP EVAL
Occupational Therapy   Evaluation and Treatment    Name: Miguel Sawant Jr.  MRN: [id number]  Admitting Diagnosis: Unilateral primary osteoarthritis, right knee  Recent Surgery: Procedure(s) (LRB):  ARTHROPLASTY, KNEE, TOTAL (Right) Day of Surgery    Recommendations:     Discharge Recommendations: Low Intensity Therapy  Discharge Equipment Recommendations:  none  Barriers to discharge:       Assessment:     Miguel Sawant Jr. is a 84 y.o. male with a medical diagnosis of Unilateral primary osteoarthritis, right knee.  He presents with the following performance deficits affecting function: weakness, impaired self care skills, impaired functional mobility, gait instability, decreased ROM, orthopedic precautions, pain, impaired joint extensibility, edema, impaired skin, decreased coordination, decreased safety awareness, decreased lower extremity function.      Rehab Prognosis: Good; patient would benefit from acute skilled OT services to address these deficits and reach maximum level of function.       Plan:     Patient to be seen 5 x/week to address the above listed problems via self-care/home management  Plan of Care Expires: 04/25/24  Plan of Care Reviewed with: patient    Subjective     Chief Complaint: None  Patient/Family Comments/goals: Pt reporting his son is in town from [place] to be his caregiver for the next week.    Occupational Profile:  Living Environment: Lives alone in [facility] with 3 OTIS, left HR, walk-in shower  Previous level of function: Indep, drives  Roles and Routines: Gardening, Retired, Father  Equipment Used at Home:  (Has RW, BSC and SPC but was not using them prior to surgery.)  Assistance upon Discharge: Son for a week    Pain/Comfort:  Pain Rating 1: 2/10 (Tightness/soreness in right anterior thigh/quad)  Location - Side 1: Right  Location 1: knee  Pain Addressed 1: Pre-medicate for activity, Reposition, Distraction, Cessation of Activity  Pain Rating Post-Intervention 1: 2/10    Patients  cultural, spiritual, Advent conflicts given the current situation: no    Objective:     Communicated with:  Patient found up in chair with cryotherapy, telemetry, peripheral IV, SCD, FCD upon OT entry to room.    General Precautions: Standard, fall  Orthopedic Precautions: RLE weight bearing as tolerated  Braces: N/A  Respiratory Status: Room air    Occupational Performance:    Bed Mobility:    NT    Functional Mobility/Transfers:  Sit to stand: CGA with RW  Transfers: CGA with RW, verbal cues for sequencing   Functional Mobility: CGA with RW, verbal cues for sequencing, turning and staying inside of RW  Receptive to all safety education and strategies to reduce fall risk    Activities of Daily Living:  Grooming: Education and training on placement of RW when standing at sink for grooming tasks and standing inside RW  UB Dressing: Set up, education on sitting to don shirt which pt stating he does that at home for safety as well  LB Dressing: Education, OT demo and training on adaptive techniques while keeping right LE in alignment   Toileting: Pt not having to urinate but educated and trained in placement of RW when standing at toilet to decrease fall risk      Cognitive/Visual Perceptual:  No deficits noted; receptive to all education and training on safety strategies and TKA precautions.     Physical Exam:  UE Function: AROM, Strength, and Coordination are WFL for self care tasks  Sitting Balance: SBA  Standing Balance: SBA with RW for static standing, CGA with RW for dynamic standing with minimal challenge  Skin: Surgical incision covered by ACE wrap and cryotherapy    AMPA 6 Click ADL:  AMPA Total Score: 19    Treatment & Education:  Role OT, POC, TKA precautions and how to adhere to precautions during ADL and ADL mobility, safety strategies to reduce fall risk, discharge recs     Patient left up in chair with all lines intact and PTBatsheva, present    GOALS:   Multidisciplinary Problems       Occupational  Therapy Goals          Problem: Occupational Therapy    Goal Priority Disciplines Outcome Interventions   Occupational Therapy Goal     OT, PT/OT Ongoing, Progressing    Description: Goals to be met by: 4/25/24     Patient will increase functional independence with ADLs by performing:    Demonstrate understanding of TKA precautions and how to adhere to precautions during LB self care tasks.  Demonstrate understanding of TKA precautions and how to adhere to precautions during ADL mobility and transfers using RW.  Toileting at SBA.   Toilet transfers at SBA.  Grooming standing at sink at SBA.  Donning underwear/pants/shorts at SBA.   Donning socks at Set up/SBA.  Donning shoes at Set up/SBA.                         History:     Past Medical History:   Diagnosis Date    Blood transfusion     Heart murmur     Hypertension     S/P TAVR (transcatheter aortic valve replacement)          Past Surgical History:   Procedure Laterality Date    JOINT REPLACEMENT      left    KNEE ARTHROSCOPY      LEFT HEART CATHETERIZATION Right 7/30/2021    Procedure: CATHETERIZATION, HEART, LEFT LV MEGHNA POSSIBLE;  Surgeon: Paco Queen MD;  Location: [facility] CATH LAB;  Service: Cardiology;  Laterality: Right;    RIGHT HEART CATHETERIZATION Right 7/30/2021    Procedure: INSERTION, CATHETER, RIGHT HEART LV MEGHNA POSSIBLE;  Surgeon: Paco Queen MD;  Location: [facility] CATH LAB;  Service: Cardiology;  Laterality: Right;    TONSILLECTOMY         Time Tracking:     OT Date of Treatment: 04/22/24  OT Start Time: 1400  OT Stop Time: 1420  OT Total Time (min): 20 min    Billable Minutes:Evaluation 6  Self Care/Home Management 14    4/22/2024

## 2024-04-22 NOTE — ANESTHESIA POSTPROCEDURE EVALUATION
Anesthesia Post Evaluation    Patient: Miguel Sawant     Procedure(s) Performed: Procedure(s) (LRB):  ARTHROPLASTY, KNEE, TOTAL (Right)    Final Anesthesia Type: spinal      Patient location during evaluation: PACU  Patient participation: Yes- Able to Participate  Level of consciousness: awake and alert  Post-procedure vital signs: reviewed and stable  Pain management: adequate  Airway patency: patent    PONV status at discharge: No PONV  Anesthetic complications: no      Cardiovascular status: blood pressure returned to baseline  Respiratory status: unassisted, spontaneous ventilation and room air  Hydration status: euvolemic  Follow-up not needed.          Vitals Value Taken Time   /63 04/22/24 1033   Temp 36.6 °C (97.8 °F) 04/22/24 1021   Pulse 54 04/22/24 1038   Resp 16 04/22/24 1029   SpO2 97 % 04/22/24 1038   Vitals shown include unfiled device data.      Event Time   Out of Recovery 10:50:38         Pain/Rizwan Score: Pain Rating Prior to Med Admin: 0 (4/22/2024 10:29 AM)  Rizwan Score: 10 (4/22/2024 10:19 AM)

## 2024-04-22 NOTE — PLAN OF CARE
[facility]  [address]  [place] [address]  [phone]               [facility] Orthopedic Discharge Orders    Home Karan         Expected Discharge Date:     Diagnoses:  Post-op R TKR replacement    Patient is homebound due to:   Pt requires home health services due to taxing effort to leave the home as a result of immobility from Post-op R TKR  replacement    Weight Bearing Status:   full weight bearing: FWB unless otherwise indicated.  Progress to cane as able.  Set up for outpatient PT as soon as able after staple removal once patient is MOD 1 with cane.    Physical Therapy:   3 times a week for 2 weeks (Call for further orders beyond 2 weeks)  - Ambulate with a rolling walker  - Progress to cane  - Instruct on ROM and strengthening of knee    Aide to provide assistance with personal care and  ADLs  2 times a week for 2 weeks    Wound Care:   Surgical dressing change:Starting on  post op day 2 then, 3 times per week and prn saturation.Change dressing while knee in flexed position utilizing island dressing or apply gauze and cover with tegaderm.  Teach patient to change daily if draining.  Pt may shower if surgical dressing is waterproof. Protect surgical dressing from getting wet by using press and seal saranwrap or garbage bag. Removal and replacement of dressing after shower only needed if incision is suspected  to have gotten wet during shower.  Otherwise change as previously described depending on dressing/drainage. No soaking in the tub or hot tub use for 6 weeks.    PT/SN to remove staples 14 days Post-op and apply skin prep and steri-strips.    Cold therapy/Ice: Encouraged at least 20 minutes 2-3 times daily or more if desired.  Incision must be kept waterproof while icing.  Wear TEDS Bilateral Thigh High Stockings for 3 weeks. Deena hose x 3 weeks. Ok to remove deena hose 1-2 hours/day max if desired.   If CPM ordered Utilize 2 hours in morning and 2 hours in evening. ,  Start at -5 degrees extension and 50 degree flexion. Increase flexion 10 degrees daily. Low post op mobility.       Contact:  Please contact Dr Wade Soriano [phone] with concerns.        BLOOD THINNER:    If sent home on Xarelto         -14 days post-op for TKR       -30 days post-op for THR     If sent home home on ASA    81mg   BID x 4 weeks     Once finished with prescribed blood thinner, patients can return to pre-surgical ASA dosage if they took ASA before surgery.       Outpatient Therapy: [facility]    [address] [address]   or  [address] [place] [address]    Call [phone] to schedule appointment  Fax [phone]    If need orders: Call Funmi at Ext [phone]        DME:  - rolling Walker  - 3 in 1 commode.   - tub bench / shower chair  - Per PT/OT recommendation          Wade Soriano

## 2024-04-22 NOTE — ANESTHESIA PROCEDURE NOTES
Spinal    Diagnosis: DJD right knee  Patient location during procedure: holding area  Timeout: 4/22/2024 8:20 AM    Staffing  Authorizing Provider: Sumeet Vargas MD  Performing Provider: Sumeet Vargas MD    Staffing  Performed by: Sumeet Vargas MD  Authorized by: Sumeet Vargas MD    Preanesthetic Checklist  Completed: patient identified, IV checked, site marked, risks and benefits discussed, surgical consent, monitors and equipment checked, pre-op evaluation and timeout performed  Spinal Block  Patient position: sitting  Prep: ChloraPrep  Patient monitoring: heart rate, cardiac monitor, continuous pulse ox and frequent blood pressure checks  Approach: midline  Location: L3-4  Injection technique: single shot  CSF Fluid: clear free-flowing CSF  Needle  Needle gauge: 22 G  Needle length: 4 in  Additional Documentation: incremental injection, negative aspiration for heme and no paresthesia on injection  Needle localization: anatomical landmarks  Assessment  Ease of block: easy  Patient's tolerance of the procedure: comfortable throughout block and no complaints  Medications:    Medications: ropivacaine (NAROPIN) injection 0.5% - Intraspinal   3 mL - 4/22/2024 8:24:00 AM

## 2024-04-23 VITALS
WEIGHT: 200 LBS | HEIGHT: 71 IN | HEART RATE: 56 BPM | RESPIRATION RATE: 12 BRPM | SYSTOLIC BLOOD PRESSURE: 111 MMHG | BODY MASS INDEX: 28 KG/M2 | DIASTOLIC BLOOD PRESSURE: 56 MMHG | TEMPERATURE: 98 F | OXYGEN SATURATION: 96 %

## 2024-04-23 LAB
ERYTHROCYTE [DISTWIDTH] IN BLOOD BY AUTOMATED COUNT: 12.1 % (ref 11.5–14.5)
HCT VFR BLD AUTO: 34.3 % (ref 40–54)
HGB BLD-MCNC: 11.4 G/DL (ref 14–18)
MCH RBC QN AUTO: 34.8 PG (ref 27–31)
MCHC RBC AUTO-ENTMCNC: 33.2 G/DL (ref 32–36)
MCV RBC AUTO: 105 FL (ref 82–98)
PLATELET # BLD AUTO: 146 K/UL (ref 150–450)
PMV BLD AUTO: 12.2 FL (ref 9.2–12.9)
RBC # BLD AUTO: 3.28 M/UL (ref 4.6–6.2)
WBC # BLD AUTO: 10.57 K/UL (ref 3.9–12.7)

## 2024-04-23 PROCEDURE — 97116 GAIT TRAINING THERAPY: CPT | Mod: CQ

## 2024-04-23 PROCEDURE — 25000003 PHARM REV CODE 250: Performed by: NURSE PRACTITIONER

## 2024-04-23 PROCEDURE — 36415 COLL VENOUS BLD VENIPUNCTURE: CPT | Performed by: ORTHOPAEDIC SURGERY

## 2024-04-23 PROCEDURE — 25000003 PHARM REV CODE 250: Performed by: ORTHOPAEDIC SURGERY

## 2024-04-23 PROCEDURE — 85027 COMPLETE CBC AUTOMATED: CPT | Performed by: ORTHOPAEDIC SURGERY

## 2024-04-23 PROCEDURE — 97110 THERAPEUTIC EXERCISES: CPT | Mod: CQ

## 2024-04-23 PROCEDURE — 97535 SELF CARE MNGMENT TRAINING: CPT

## 2024-04-23 PROCEDURE — 94799 UNLISTED PULMONARY SVC/PX: CPT

## 2024-04-23 RX ORDER — ONDANSETRON 8 MG/1
8 TABLET, ORALLY DISINTEGRATING ORAL EVERY 8 HOURS PRN
Qty: 20 TABLET | Refills: 1 | Status: SHIPPED | OUTPATIENT
Start: 2024-04-23

## 2024-04-23 RX ORDER — HYDROCODONE BITARTRATE AND ACETAMINOPHEN 5; 325 MG/1; MG/1
TABLET ORAL
Qty: 60 TABLET | Refills: 0 | Status: SHIPPED | OUTPATIENT
Start: 2024-04-23

## 2024-04-23 RX ADMIN — FAMOTIDINE 20 MG: 20 TABLET ORAL at 07:04

## 2024-04-23 RX ADMIN — POLYETHYLENE GLYCOL 3350 17 G: 17 POWDER, FOR SOLUTION ORAL at 07:04

## 2024-04-23 RX ADMIN — MUPIROCIN: 20 OINTMENT TOPICAL at 08:04

## 2024-04-23 RX ADMIN — DOCUSATE SODIUM 100 MG: 100 CAPSULE, LIQUID FILLED ORAL at 07:04

## 2024-04-23 RX ADMIN — ATORVASTATIN CALCIUM 20 MG: 20 TABLET, FILM COATED ORAL at 07:04

## 2024-04-23 RX ADMIN — AMIODARONE HYDROCHLORIDE 100 MG: 100 TABLET ORAL at 07:04

## 2024-04-23 RX ADMIN — HYDROCODONE BITARTRATE AND ACETAMINOPHEN 1 TABLET: 5; 325 TABLET ORAL at 07:04

## 2024-04-23 RX ADMIN — ACETAMINOPHEN 1000 MG: 500 TABLET ORAL at 05:04

## 2024-04-23 NOTE — PROGRESS NOTES
"MED  Progress Note    Admit Date: 4/22/2024   LOS: 0 days     SUBJECTIVE:     Follow-up For:  Unilateral primary osteoarthritis, right knee    Interval History:     Uneventful night except mild bleeding from incision site.  No CP/SOB/Calf pain.  Doing well this am.      Review of Systems:  Constitutional: No fever or chills  Respiratory: No cough or shortness of breath  Cardiovascular: No chest pain or palpitations  Gastrointestinal: No nausea or vomiting  Neurological: No confusion or weakness    OBJECTIVE:     Vital Signs Range (Last 24H):  BP (!) 111/57 (BP Location: Right arm, Patient Position: Lying)   Pulse (!) 58   Temp 97.8 °F (36.6 °C) (Oral)   Resp 18   Ht 5' 11" (1.803 m)   Wt 90.7 kg (200 lb)   SpO2 96%   BMI 27.89 kg/m²     Temp:  [96 °F (35.6 °C)-97.9 °F (36.6 °C)]   Pulse:  [53-60]   Resp:  [16-18]   BP: ()/(54-67)   SpO2:  [94 %-100 %]     I & O (Last 24H):  Intake/Output Summary (Last 24 hours) at 4/23/2024 0733  Last data filed at 4/23/2024 0530  Gross per 24 hour   Intake 1650 ml   Output 1450 ml   Net 200 ml       Physical Exam:  General appearance: Well developed, well nourished  Eyes:  Conjunctivae/corneas clear. PERRL.  Lungs: Normal respiratory effort,   clear to auscultation bilaterally   Heart: Malik-Regular rate and rhythm, S1, S2 normal, 2/6 murmur  Abdomen: Soft, non-tender non-distended; bowel sounds normal; no masses,  no organomegaly  Extremities: No cyanosis or clubbing. No edema.    Skin: Skin color, texture, turgor normal. No rashes or lesions  Neurologic: Normal strength and tone. No focal numbness or weakness   Right Knee CDI    Laboratory Data:  Recent Labs   Lab 04/23/24  0524   WBC 10.57   RBC 3.28*   HGB 11.4*   HCT 34.3*   *   *   MCH 34.8*   MCHC 33.2       BMP: No results for input(s): "GLU", "NA", "K", "CL", "CO2", "BUN", "CREATININE", "CALCIUM", "MG", "PHOS" in the last 168 hours.  Lab Results   Component Value Date    CALCIUM 10.5 04/12/2024 " "      Lab Results   Component Value Date    CALCIUM 10.5 04/12/2024       No results found for: "URICACID"    BNP  No results for input(s): "BNP", "BNPTRIAGEBLO" in the last 168 hours.    Medications:  Medication list was reviewed and changes noted under Assessment/Plan.    Diagnostic Results:        ASSESSMENT/PLAN:     Right Knee:  per ortho/therapy teams.       TAVR/Fib:  Cleared by Dr. Queen.  Placed on tele w/o events.       HTN:  Hold parameters.  Encourage oral intake with soft BPs.  Resume home regimen when SBP >130     Lipids:  statin.     DVT:  resume eliquis today.       Medically stable.     High MDM complexity necessary to treat or prevent imminent or life-threatening deterioration of the following conditions: TAVR/HTN  Time spent personally by me on the following activities 45 min: development of treatment plan with patient or surrogate, discussions with consultants, interpretation of cardiac output measurements, examination of patient, ordering and performing treatments and interventions, evaluation of patient's response to treatment, obtaining history from patient or surrogate, ordering and review of laboratory studies, ordering and review of radiographic studies, re-evaluation of patient's condition, pulse oximetry and blood draw for specimens       "

## 2024-04-23 NOTE — NURSING
Discharge instructions provided. Patient verbalized understanding.. iv removed.. patient dressed and waiting for prescription to be delivered to bedside..

## 2024-04-23 NOTE — PLAN OF CARE
Problem: Adult Inpatient Plan of Care  Goal: Optimal Comfort and Wellbeing  Outcome: Progressing     Problem: Fall Injury Risk  Goal: Absence of Fall and Fall-Related Injury  Outcome: Progressing     Problem: Infection  Goal: Absence of Infection Signs and Symptoms  Outcome: Progressing

## 2024-04-23 NOTE — PLAN OF CARE
Problem: Adult Inpatient Plan of Care  Goal: Plan of Care Review  Outcome: Met  Goal: Patient-Specific Goal (Individualized)  Outcome: Met  Goal: Absence of Hospital-Acquired Illness or Injury  Outcome: Met  Goal: Optimal Comfort and Wellbeing  Outcome: Met  Goal: Readiness for Transition of Care  Outcome: Met     Problem: Fall Injury Risk  Goal: Absence of Fall and Fall-Related Injury  Outcome: Met     Problem: Infection  Goal: Absence of Infection Signs and Symptoms  Outcome: Met     Problem: Pain Acute  Goal: Acceptable Pain Control and Functional Ability  Outcome: Met

## 2024-04-23 NOTE — PLAN OF CARE
Problem: Physical Therapy  Goal: Physical Therapy Goal  Description: Goals to be met by: 24     Patient will increase functional independence with mobility by performin. Supine to sit with supervision.   2. Sit to supine with supervision.   3. Sit<>stand transfer with supervision using rolling walker.   4. Gait > 150 feet with SBA using rolling walker. - MET 24  5. Ascend/descend 3 stairs with L handrails with CGA and LR AD. - MET 24    Supine>sit with SBA  Sit>stand with RW and SBA  Amb 180' with RW and SBA, RLE WBAT, step-through gait pattern  Ascended/descended 4 steps with L HR and CGA  Pt demos good  mobility, minimal pain reported  Rec Low Intensity Therapy  Outcome: Progressing

## 2024-04-23 NOTE — NURSING
Dressing saturated. MD notified. Ordered to remove cast padding, reinforce original xeroform with 4x4's, ABD, cast padding and ACE wrap. RLE elevated. Will continue to monitor.

## 2024-04-23 NOTE — PROGRESS NOTES
"Progress Note  Orthopedics    Admit Date: 4/22/2024   Patient ID: Miguel Sawant Jr. is a 84 y.o. male.  Up day 1. Right total knee replacement.  Patient doing very well.  Dressing dry, neurovascularly intact.  Ambulated 100 ft with PT yesterday.  Plan for discharge today with home health.  Follow-up in 4 weeks.            Wade BARRAGAN formerly [facility]      Vital Sign (recent):  BP (!) 111/56 (Patient Position: Sitting)   Pulse (!) 56   Temp 97.5 °F (36.4 °C) (Oral)   Resp 12   Ht 5' 11" (1.803 m)   Wt 90.7 kg (200 lb)   SpO2 96%   BMI 27.89 kg/m²       Laboratory:    CBC:   Recent Labs   Lab 04/23/24  0524   WBC 10.57   RBC 3.28*   HGB 11.4*   HCT 34.3*   *   *   MCH 34.8*   MCHC 33.2       CMP: No results for input(s): "GLU", "CALCIUM", "ALBUMIN", "PROT", "NA", "K", "CO2", "CL", "BUN", "CREATININE", "ALKPHOS", "ALT", "AST", "BILITOT" in the last 168 hours.        Intake/Output Summary (Last 24 hours) at 4/23/2024 0825  Last data filed at 4/23/2024 0530  Gross per 24 hour   Intake 1650 ml   Output 1450 ml   Net 200 ml         Current Medications:  Current Facility-Administered Medications   Medication Dose Route Frequency    amiodarone  100 mg Oral Daily    atorvastatin  20 mg Oral Daily    docusate sodium  100 mg Oral Q12H    famotidine  20 mg Oral BID    lisinopriL  5 mg Oral Daily    metoprolol succinate  25 mg Oral Daily    mupirocin   Nasal BID    polyethylene glycol  17 g Oral Daily       Continuous Infusions:  Current Facility-Administered Medications   Medication Dose Route Frequency Last Rate Last Admin    dextrose 5 % and 0.9 % NaCl   Intravenous Continuous 75 mL/hr at 04/22/24 1310 New Bag at 04/22/24 1310     PRN Meds:.  Current Facility-Administered Medications:     diphenhydrAMINE, 25 mg, Intravenous, Q6H PRN    HYDROcodone-acetaminophen, 1 tablet, Oral, Q4H PRN    metoclopramide, 5 mg, Intravenous, Q6H PRN    ondansetron, 8 mg, Intravenous, Q8H PRN    sodium chloride 0.9%, 3 mL, Intravenous, " Q6H PRN

## 2024-04-23 NOTE — PT/OT/SLP PROGRESS
Physical Therapy Treatment    Patient Name:  Miguel Sawant Jr.   MRN:  [id number]    Recommendations:     Discharge Recommendations: Low Intensity Therapy  Discharge Equipment Recommendations: none  Barriers to discharge: Inaccessible home    Assessment:     Miguel Sawant Jr. is a 84 y.o. male admitted with a medical diagnosis of Unilateral primary osteoarthritis, right knee.  He presents with the following impairments/functional limitations: weakness, gait instability, orthopedic precautions, pain, edema, impaired endurance, impaired functional mobility, impaired joint extensibility, impaired skin, decreased coordination, decreased lower extremity function, decreased ROM, decreased safety awareness.    Supine>sit with SBA  Sit>stand with RW and SBA  Amb 180' with RW and SBA, RLE WBAT, step-through gait pattern  Ascended/descended 4 steps with L HR and CGA  Pt demos good  mobility, minimal pain reported  Rec Low Intensity Therapy    Rehab Prognosis: Good; patient would benefit from acute skilled PT services to address these deficits and reach maximum level of function.    Recent Surgery: Procedure(s) (LRB):  ARTHROPLASTY, KNEE, TOTAL (Right) 1 Day Post-Op    Plan:     During this hospitalization, patient to be seen BID to address the identified rehab impairments via gait training, therapeutic activities, therapeutic exercises and progress toward the following goals:    Plan of Care Expires:  04/29/24    Subjective     Chief Complaint: none stated  Patient/Family Comments/goals: son had been nervous about getting pt up stairs into house  Pain/Comfort:  Pain Rating 1: 0/10  Pain Rating Post-Intervention 1: 0/10      Objective:     Communicated with nurse Lund prior to session.  Patient found HOB elevated with cryotherapy, SCD, FCD, peripheral IV upon PT entry to room.     General Precautions: Standard, fall  Orthopedic Precautions: RLE weight bearing as tolerated  Braces: N/A  Respiratory Status: Room air      Functional Mobility:  Bed Mobility:     Supine to Sit: stand by assistance  Transfers:     Sit to Stand:  stand by assistance with rolling walker  Gait: 180' with RW and SBA, RLE WBAT, step-through gait pattern  Stairs:  Pt ascended/descended 4 stair(s) with No Assistive Device with left handrail with Contact Guard Assistance.       AM-PAC 6 CLICK MOBILITY  Turning over in bed (including adjusting bedclothes, sheets and blankets)?: 3  Sitting down on and standing up from a chair with arms (e.g., wheelchair, bedside commode, etc.): 3  Moving from lying on back to sitting on the side of the bed?: 3  Moving to and from a bed to a chair (including a wheelchair)?: 3  Need to walk in hospital room?: 3  Climbing 3-5 steps with a railing?: 3  Basic Mobility Total Score: 18       Treatment & Education:  Supine therex RLE: AP, QS, GS, heel slides, hip abd/add, SAQ x 10  Gait and stair training as noted    Patient left up in chair with all lines intact, call button in reach, and son present..    GOALS:   Multidisciplinary Problems       Physical Therapy Goals          Problem: Physical Therapy    Goal Priority Disciplines Outcome Goal Variances Interventions   Physical Therapy Goal     PT, PT/OT Progressing     Description: Goals to be met by: 24     Patient will increase functional independence with mobility by performin. Supine to sit with supervision.   2. Sit to supine with supervision.   3. Sit<>stand transfer with supervision using rolling walker.   4. Gait > 150 feet with SBA using rolling walker. - MET 24  5. Ascend/descend 3 stairs with L handrails with CGA and LR AD. - MET 24                       Time Tracking:     PT Received On: 24  PT Start Time: 845     PT Stop Time: 920  PT Total Time (min): 35 min     Billable Minutes: Gait Training 18 and Therapeutic Exercise 17    Treatment Type: Treatment  PT/PTA: PTA     Number of PTA visits since last PT visit: 2024

## 2024-04-23 NOTE — PT/OT/SLP PROGRESS
Occupational Therapy   Treatment    Name: Miguel Sawant Jr.  MRN: [id number]  Admitting Diagnosis:  Unilateral primary osteoarthritis, right knee  1 Day Post-Op    Recommendations:     Discharge Recommendations: Low Intensity Therapy  Discharge Equipment Recommendations:  none  Barriers to discharge:  None    Assessment:     Miguel Sawant Jr. is a 84 y.o. male with a medical diagnosis of Unilateral primary osteoarthritis, right knee.  He presents with the following performance deficits affecting function:  weakness, impaired sensation, impaired self care skills, impaired functional mobility, gait instability, abnormal tone, decreased ROM, orthopedic precautions, impaired joint extensibility, decreased lower extremity function, edema, impaired skin, decreased coordination.  Pt and son receptive to all education, OT demo and pt training on pt adhering to TKA precautions and safety strategies during ADL and ADL mobility.       Rehab Prognosis:  Good; patient would benefit from acute skilled OT services to address these deficits and reach maximum level of function.       Plan:     Patient to be seen 5 x/week to address the above listed problems via self-care/home management  Plan of Care Expires: 04/23/24 (Discharging home today)  Plan of Care Reviewed with: patient, son    Subjective     Chief Complaint: None  Patient/Family Comments/goals: Looking forward to going home.   Pain/Comfort:  Pain Rating 1: 0/10  Pain Rating Post-Intervention 1: 0/10    Objective:     Communicated with: Marek TORRES. prior to session.  Patient found up in chair with cryotherapy, FCD, SCD upon OT entry to room.  Son in room    General Precautions: Standard, fall    Orthopedic Precautions:RLE weight bearing as tolerated  Braces: N/A  Respiratory Status: Room air     Occupational Performance:     Bed Mobility:    NT     Functional Mobility/Transfers:  Sit to stand: SBA with RW  Transfers: SBA with RW  Pt reporting having a large walk-in shower  with hand held shower head and grab bars around shower  Functional Mobility: Reporting daughter bringing him a RW that has a walker basket for transport of ADL items, education on transport of clothing items, pt confirmed that this RW with the basket was not a rollator.   Education and demo of technique for elevating legs when using chair or ottoman, having right knee supported.    Activities of Daily Living:  LB Dressing: Donning underwear - SBA, Donning pants - Min A, Donning socks - Mod A for right foot and Set up for left foot, Donning shoes - Mod A for right shoe and Set up/SBA for donning left shoe  Toileting: SBA, toilet or urinal  UB Dressing: Set up   Grooming: SBA when standing at sink  Showering: Education on safety strategies to reduce fall risk.      [facility] 6 Click ADL: 19    Treatment & Education:  Role of OT, POC, adhering to TKA precuations during ADL and ADL mobility, safety strategies to reduce fall risk, availability of adaptive equipment for LB self care tasks, use of DME, car transfer technique, JENNIFER hose donning and safety, use, donning and precautions with cryotherapy, support of right knee when elevating LE, safety with showering, discharge recs     Patient left up in chair with all lines intact, call button in reach, nurse notified, son present, and cryotherapy to pt's right knee and bilateral LE elevated.    GOALS:   Multidisciplinary Problems       Occupational Therapy Goals          Problem: Occupational Therapy    Goal Priority Disciplines Outcome Interventions   Occupational Therapy Goal     OT, PT/OT Progressing    Description: Goals to be met by: 4/25/24     Patient will increase functional independence with ADLs by performing:    Demonstrate understanding of TKA precautions and how to adhere to precautions during LB self care tasks.  Demonstrate understanding of TKA precautions and how to adhere to precautions during ADL mobility and transfers using RW.  Toileting at SBA.   Toilet  transfers at SBA.  Grooming standing at sink at SBA.  Donning underwear/pants/shorts at SBA.   Donning socks at Set up/SBA.  Donning shoes at Set up/SBA.                         Time Tracking:     OT Date of Treatment: 04/23/24  OT Start Time: 1005  OT Stop Time: 1050  OT Total Time (min): 45 min    Billable Minutes:Self Care/Home Management 45    OT/JOCELINE: OT          4/23/2024

## 2024-04-23 NOTE — PLAN OF CARE
LMSW met with patient via bedside. Patient will be discharging with [facility] as HH. Patient already has a RW and BSC. Patient's preferred pharmacy is [facility]. Patient's family will provide transportation home up discharge.     [facility] ([address])  Discharge Final Note    Primary Care Provider: Earnest Sanford MD    Expected Discharge Date: 4/23/2024    Final Discharge Note (most recent)       Final Note - 04/23/24 0907          Final Note    Assessment Type Final Discharge Note (P)      Anticipated Discharge Disposition Home-Health Care Svc (P)      Hospital Resources/Appts/Education Provided Provided patient/caregiver with written discharge plan information;Appointments scheduled and added to AVS (P)         Post-Acute Status    Post-Acute Authorization Home Health (P)      Home Health Status Set-up Complete/Auth obtained (P)      Discharge Delays None known at this time (P)                    Contact Info       [facility]   Specialty: [facility] Health Services, Home Therapy Services, Home Living Aide Services    [address]  [facility] [place] [address]   Phone: [phone]       Next Steps: Follow up on 4/24/2024    Instructions: They will contact you for home healthcare appointments.    Wade Soriano MD   Specialty: Orthopedic Surgery    [address] [facility]  [facility] [address]   Phone: [phone]       Next Steps: Follow up in 4 week(s)

## 2024-05-03 NOTE — DISCHARGE SUMMARY
[facility]  Short Stay  Discharge Summary  TOTAL KNEE REPLACEMENT    Admit Date: 4/22/2024    Discharge Date and Time: 4/23/2024 12:39 PM      Discharge Attending Physician: Wade Soriano MD    Hospital Course:  Miguel Sawant Jr.,is a 84 y.o. male with severe osteoarthritis R knee, unrelieved with the conservative measures. The patient was admitted on  4/22/2024 and underwent R total knee replacement with computer-assisted navigation. Postoperatively, weightbearing as tolerated with a walker and CPM machine was initiated on postop day #1. Miguel Sawant Jr. did quite well and was discharged on 4/23/2024  with home health physical therapy and nursing. The patient will be on Percocet for pain and aspirin 325 mg p.o. b.i.d. with meals x4 weeks.  A CPM machine will will be sent home with the patient. Postoperative follow up will be in the office in 4 weeks.       Final Diagnoses:    Principal Problem: Unilateral primary osteoarthritis, right knee   Secondary Diagnoses:   Active Hospital Problems    Diagnosis  POA    *Unilateral primary osteoarthritis, right knee [M17.11]  Yes      Resolved Hospital Problems   No resolved problems to display.       Discharged Condition: good    Disposition: Home-Health Care [facility]    Follow up/Patient Instructions:    Medications:  Reconciled Home Medications:      Medication List        START taking these medications      HYDROcodone-acetaminophen 5-325 mg per tablet  Commonly known as: NORCO  Take 1 tablet by mouth every 4 hours as needed or 2 tablets every 6 hours as needed     ondansetron 8 MG Tbdl  Commonly known as: ZOFRAN-ODT  Take 1 tablet (8 mg total) by mouth every 8 (eight) hours as needed (Nausea).            CHANGE how you take these medications      ELIQUIS 2.5 mg Tab  Generic drug: apixaban  Start 1 tablet by mouth at 9pm tonight, then 1 tablet twice daily for 2 day, then resume 2 tablets twice daily  What changed:   medication strength  how much to take  how to  "take this  additional instructions            CONTINUE taking these medications      amiodarone 200 MG Tab  Commonly known as: PACERONE  Take 100 mg by mouth once daily.     aspirin 81 MG EC tablet  Commonly known as: ECOTRIN  Take 81 mg by mouth once daily.     atorvastatin 20 MG tablet  Commonly known as: LIPITOR  TAKE 1 TABLET DAILY     CENTRUM SILVER ORAL  Take by mouth once daily.     furosemide 40 MG tablet  Commonly known as: LASIX  Take 40 mg by mouth 2 (two) times daily.     metoprolol succinate 25 MG 24 hr tablet  Commonly known as: TOPROL-XL  TAKE 1 TABLET DAILY     ramipriL 10 MG capsule  Commonly known as: ALTACE  TAKE 1 CAPSULE DAILY     spironolactone 25 MG tablet  Commonly known as: ALDACTONE  Take 25 mg by mouth.            STOP taking these medications      glucosamine-chondroitin 500-400 mg tablet            Discharge Procedure Orders   WALKER FOR HOME USE     Order Specific Question Answer Comments   Type of Walker: Adult (5'4"-6'6")    With wheels? Yes    Height: 5' 11" (1.803 m)    Weight: 90.7 kg (200 lb)    Length of need (1-99 months): 99      HIP KIT FOR HOME USE     Order Specific Question Answer Comments   Height: 5' 11" (1.803 m)    Weight: 90.7 kg (200 lb)    Length of need (1-99 months): 99    Type: [place] Hip Kit      3 IN 1 COMMODE FOR HOME USE     Order Specific Question Answer Comments   Type: Standard    Height: 5' 11" (1.803 m)    Weight: 90.7 kg (200 lb)    Length of need (1-99 months): 99      TRANSFER TUB BENCH FOR HOME USE     Order Specific Question Answer Comments   Type of Transfer Tub Bench: Unpadded    Height: 5' 11" (1.803 m)    Weight: 90.7 kg (200 lb)    Length of need (1-99 months): 99    Patient notified - Not covered by insurance considered a convenience item Yes    Discussed financial responsibility with responsible party Yes      BATH/SHOWER CHAIR FOR HOME USE     Order Specific Question Answer Comments   Height: 5' 11" (1.803 m)    Weight: 90.7 kg (200 lb)  "   Length of need (1-99 months): 99    Type: With back      Change dressing (specify)   Order Comments: Dressing change to be performed by Home Health nurse.      Follow-up Information       [facility] Follow up on 4/24/2024.    Specialties: Home Health Services, Home Therapy Services, Home Living Aide Services  Why: They will contact you for home healthcare appointments.  Contact information:  [address]  [place] [address]  [phone]             Wade Soriano MD Follow up in 4 week(s).    Specialty: Orthopedic Surgery  Contact information:  [address]  [facility]  [facility] [address]  [phone]

## 2024-06-10 ENCOUNTER — PATIENT MESSAGE (OUTPATIENT)
Dept: INTERNAL MEDICINE | Facility: CLINIC | Age: 84
End: 2024-06-10
Payer: MEDICARE

## 2024-08-28 ENCOUNTER — OFFICE VISIT (OUTPATIENT)
Dept: INTERNAL MEDICINE | Facility: CLINIC | Age: 84
End: 2024-08-28
Payer: MEDICARE

## 2024-08-28 VITALS
WEIGHT: 213.38 LBS | SYSTOLIC BLOOD PRESSURE: 101 MMHG | HEIGHT: 71 IN | OXYGEN SATURATION: 97 % | BODY MASS INDEX: 29.87 KG/M2 | HEART RATE: 71 BPM | DIASTOLIC BLOOD PRESSURE: 56 MMHG

## 2024-08-28 DIAGNOSIS — Z96.651 STATUS POST RIGHT KNEE REPLACEMENT: Primary | ICD-10-CM

## 2024-08-28 DIAGNOSIS — E78.5 HYPERLIPIDEMIA, UNSPECIFIED HYPERLIPIDEMIA TYPE: ICD-10-CM

## 2024-08-28 DIAGNOSIS — Z79.899 LONG TERM CURRENT USE OF THERAPEUTIC DRUG: ICD-10-CM

## 2024-08-28 DIAGNOSIS — G89.29 CHRONIC RIGHT-SIDED LOW BACK PAIN WITHOUT SCIATICA: ICD-10-CM

## 2024-08-28 DIAGNOSIS — M54.50 CHRONIC RIGHT-SIDED LOW BACK PAIN WITHOUT SCIATICA: ICD-10-CM

## 2024-08-28 PROCEDURE — 1125F AMNT PAIN NOTED PAIN PRSNT: CPT | Mod: CPTII,S$GLB,, | Performed by: STUDENT IN AN ORGANIZED HEALTH CARE EDUCATION/TRAINING PROGRAM

## 2024-08-28 PROCEDURE — 1160F RVW MEDS BY RX/DR IN RCRD: CPT | Mod: CPTII,S$GLB,, | Performed by: STUDENT IN AN ORGANIZED HEALTH CARE EDUCATION/TRAINING PROGRAM

## 2024-08-28 PROCEDURE — 3074F SYST BP LT 130 MM HG: CPT | Mod: CPTII,S$GLB,, | Performed by: STUDENT IN AN ORGANIZED HEALTH CARE EDUCATION/TRAINING PROGRAM

## 2024-08-28 PROCEDURE — 99999 PR PBB SHADOW E&M-EST. PATIENT-LVL III: CPT | Mod: PBBFAC,,, | Performed by: STUDENT IN AN ORGANIZED HEALTH CARE EDUCATION/TRAINING PROGRAM

## 2024-08-28 PROCEDURE — G2211 COMPLEX E/M VISIT ADD ON: HCPCS | Mod: S$GLB,,, | Performed by: STUDENT IN AN ORGANIZED HEALTH CARE EDUCATION/TRAINING PROGRAM

## 2024-08-28 PROCEDURE — 99214 OFFICE O/P EST MOD 30 MIN: CPT | Mod: S$GLB,,, | Performed by: STUDENT IN AN ORGANIZED HEALTH CARE EDUCATION/TRAINING PROGRAM

## 2024-08-28 PROCEDURE — 1159F MED LIST DOCD IN RCRD: CPT | Mod: CPTII,S$GLB,, | Performed by: STUDENT IN AN ORGANIZED HEALTH CARE EDUCATION/TRAINING PROGRAM

## 2024-08-28 PROCEDURE — 3078F DIAST BP <80 MM HG: CPT | Mod: CPTII,S$GLB,, | Performed by: STUDENT IN AN ORGANIZED HEALTH CARE EDUCATION/TRAINING PROGRAM

## 2024-08-28 RX ORDER — BACLOFEN 5 MG/1
5 TABLET ORAL 3 TIMES DAILY PRN
Qty: 30 TABLET | Refills: 0 | Status: SHIPPED | OUTPATIENT
Start: 2024-08-28 | End: 2024-09-07

## 2024-08-28 NOTE — PATIENT INSTRUCTIONS
Start Baclofen three times a day as needed as a muscle relaxant. Can make you drowsy, so first take in the evening and avoid driving/operating heavy machinery while taking.     Over the Counter Options for Pain    Voltaren Gel: Apply 2 g to skin over affected areas 4 times a day as needed.     Capsaicin Cream: Apply to skin over affected area 3 to 4 times a day as needed. Do not apply to open wounds or irritated skin. Wash your hands after application to avoid getting it in eyes.     Lidocaine (Salonpas) Patch: Apply over affected are and leave in place for 8 to 12 hours as needed.     Acetaminophen (Tylenol): Take 1,000 mg by mouth every 4 to 6 hours as needed. Do not take more than 4,000 mg in a day.     Ice Packs:  - Fill a bag with crushed ice about half full. Remove the air from the bag before you close it. You can also use a bag of frozen vegetables.    - Wrap the ice pack in a cloth to protect your skin from frostbite or other injury.    - Put the ice over the injured area for 20 to 30 minutes or as long as directed.  Check your skin after about 30 seconds for color changes or blistering. Remove the ice if you notice skin changes or you feel burning or numbness in the area.    - Throw the ice pack away after use.    - Apply ice to your injured area 4 times each day or as directed.   Heating Pad:  Apply to affected area for NO LONGER than 15 minutes. Use a layer of towels between your skin and the heating pad. Remove for at least 1 hour then repeat. 2-3 applications a day is advisable.

## 2024-08-28 NOTE — PROGRESS NOTES
SUBJECTIVE     Chief Complaint   Patient presents with    Follow-up    Low-back Pain       HPI  Miguel Sawant Jr. is a 84 y.o. male with HTN, HLD, pAFib, CAD, aortic valve stenosis s/p TAVR, Diastolic CHF  that presents for follow-up. LOV 2/28/24.     S/p right knee replacement in 4/2024. Doing well. No pain. Seen by surgeon recently, can follow up with ortho after a year.     Chronic intermittent lower back pain following herniated disc in the 1970s. Right lower back, does not radiate. Muscles over back feel tight. No red flag symptoms. No recent injury/trauma.       PAST MEDICAL HISTORY:  Past Medical History:   Diagnosis Date    Blood transfusion     Heart murmur     Hypertension     S/P TAVR (transcatheter aortic valve replacement)        PAST SURGICAL HISTORY:  Past Surgical History:   Procedure Laterality Date    JOINT REPLACEMENT      left    KNEE ARTHROSCOPY      LEFT HEART CATHETERIZATION Right 7/30/2021    Procedure: CATHETERIZATION, HEART, LEFT LV MEGHNA POSSIBLE;  Surgeon: Paco Queen MD;  [place] [facility] [facility] C[place] LAB;  Service: Cardiology;  Laterality: Right;    RIGHT HEART CATHETERIZATION Right 7/30/2021    Procedure: INSERTION, CATHETER, RIGHT HEART LV MEGHNA POSSIBLE;  Surgeon: Paco Queen MD;  [place] [facility] [place] [facility] C[place] LAB;  Service: Cardiology;  Laterality: Right;    TONSILLECTOMY      TOTAL KNEE ARTHROPLASTY Right 4/22/2024    Procedure: ARTHROPLASTY, KNEE, TOTAL;  Surgeon: Wade Soriano MD;  Location: [facility] [place] [place] [facility] OR;  Service: Orthopedics;  Laterality: Right;       FAMILY HISTORY:  Family History   Problem Relation Name Age of Onset    Cancer Father      COPD Father         ALLERGIES AND MEDICATIONS: updated and reviewed.  Review of patient's allergies indicates:  No Known Allergies  Current Outpatient Medications   Medication Sig Dispense Refill    amiodarone (PACERONE) 200 MG Tab Take 100 mg by mouth once daily.      apixaban (ELIQUIS) 2.5 mg Tab Start 1 tablet by mouth at 9pm tonight, then 1  "tablet twice daily for 2 day, then resume 2 tablets twice daily 5 tablet 0    aspirin (ECOTRIN) 81 MG EC tablet Take 81 mg by mouth once daily.        atorvastatin (LIPITOR) 20 MG tablet TAKE 1 TABLET DAILY 90 tablet 4    furosemide (LASIX) 40 MG tablet Take 40 mg by mouth 2 (two) times daily.      HYDROcodone-acetaminophen (NORCO) 5-325 mg per tablet Take 1 tablet by mouth every 4 hours as needed or 2 tablets every 6 hours as needed 60 tablet 0    metoprolol succinate (TOPROL-XL) 25 MG 24 hr tablet TAKE 1 TABLET DAILY 90 tablet 3    MULTIVITAMIN W-MINERALS/LUTEIN (CENTRUM SILVER ORAL) Take by mouth once daily.        ondansetron (ZOFRAN-ODT) 8 MG TbDL Take 1 tablet (8 mg total) by mouth every 8 (eight) hours as needed (Nausea). 20 tablet 1    ramipriL (ALTACE) 10 MG capsule TAKE 1 CAPSULE DAILY 90 capsule 3    spironolactone (ALDACTONE) 25 MG tablet Take 25 mg by mouth.       No current facility-administered medications for this visit.       ROS  Review of Systems   Constitutional:  Negative for activity change, chills and fever.   HENT:  Negative for congestion and hearing loss.    Eyes:  Negative for pain and visual disturbance.   Respiratory:  Negative for cough and shortness of breath.    Cardiovascular:  Negative for chest pain and palpitations.   Gastrointestinal:  Negative for abdominal pain, constipation, diarrhea, nausea and vomiting.   Endocrine: Negative.    Genitourinary: Negative.    Musculoskeletal:  Positive for back pain. Negative for arthralgias and myalgias.   Skin: Negative.    Allergic/Immunologic: Negative.    Neurological:  Negative for dizziness, light-headedness and headaches.   Hematological: Negative.          OBJECTIVE     Physical Exam  Vitals:    08/28/24 1359   BP: (!) 101/56   Pulse: 71    Body mass index is 29.76 kg/m².  Weight: 96.8 kg (213 lb 6.5 oz)   Height: 5' 11" (180.3 cm)     Physical Exam  Vitals reviewed.   Constitutional:       General: He is not in acute distress.     " Appearance: Normal appearance.   HENT:      Head: Normocephalic and atraumatic.      Mouth/Throat:      Mouth: Mucous membranes are moist.      Pharynx: Oropharynx is clear.   Eyes:      Extraocular Movements: Extraocular movements intact.      Conjunctiva/sclera: Conjunctivae normal.      Pupils: Pupils are equal, round, and reactive to light.   Cardiovascular:      Rate and Rhythm: Normal rate and regular rhythm.      Pulses: Normal pulses.      Heart sounds: Normal heart sounds.   Pulmonary:      Effort: Pulmonary effort is normal.      Breath sounds: Normal breath sounds.   Abdominal:      General: There is no distension.   Musculoskeletal:         General: Normal range of motion.      Cervical back: Normal range of motion and neck supple.        Back:    Skin:     General: Skin is warm and dry.   Neurological:      General: No focal deficit present.      Mental Status: He is alert.           Health Maintenance         Date Due Completion Date    TETANUS VACCINE Never done ---    RSV Vaccine (Age 60+ and Pregnant patients) (1 - 1-dose 60+ series) Never done ---    Pneumococcal Vaccines (Age 65+) (1 of 1 - PCV) Never done ---    COVID-19 Vaccine (5 - 2023-24 season) 06/28/2024 2/28/2024    Influenza Vaccine (1) 09/01/2024 11/1/2021    Lipid Panel 02/23/2028 2/23/2023              ASSESSMENT     84 y.o. male with     1. Status post right knee replacement    2. Chronic right-sided low back pain without sciatica    3. Hyperlipidemia, unspecified hyperlipidemia type    4. Long term current use of therapeutic drug        PLAN:     1. Status post right knee replacement  - No pain. Continue follow up with Orthopedics.     2. Chronic right-sided low back pain without sciatica  - Mechanical in nature, no red flag symptoms. Trial of muscle relaxant.   - Given list of OTC pain medications he can try including Tylenol, Voltaren Gel, Salonpas patches, capsaicin cream, ice and heat.   - baclofen (LIORESAL) 5 mg Tab tablet;  Take 1 tablet (5 mg total) by mouth 3 (three) times daily as needed (back pain).  Dispense: 30 tablet; Refill: 0    3. Hyperlipidemia, unspecified hyperlipidemia type  - Stable on statin. Continue.   - CBC Auto Differential; Future  - COMPREHENSIVE METABOLIC PANEL; Future  - LIPID PANEL; Future    4. Long term current use of therapeutic drug  - CBC Auto Differential; Future  - COMPREHENSIVE METABOLIC PANEL; Future  - LIPID PANEL; Future  - TSH; Future  - HEMOGLOBIN A1C; Future    Visit today included increased complexity associated with the care of the episodic problem addressed and managing the longitudinal care of the patient due to the serious and/or complex managed problem(s).      RTC in 6 months, lab work 1 week prior.        Earnest Sanford MD  Family Medicine  [facility]  08/28/2024    This document was created using voice recognition software ([employer] Flu[employer] Direct). Although it may be edited, this document may contain errors related to incorrect recognition of the spoken word. Please call the physician if clarification is needed.       No follow-ups on file.

## 2025-02-20 ENCOUNTER — LAB VISIT (OUTPATIENT)
Dept: LAB | Facility: HOSPITAL | Age: 85
End: 2025-02-20
Attending: STUDENT IN AN ORGANIZED HEALTH CARE EDUCATION/TRAINING PROGRAM
Payer: MEDICARE

## 2025-02-20 DIAGNOSIS — E78.5 HYPERLIPIDEMIA, UNSPECIFIED HYPERLIPIDEMIA TYPE: ICD-10-CM

## 2025-02-20 DIAGNOSIS — Z79.899 LONG TERM CURRENT USE OF THERAPEUTIC DRUG: ICD-10-CM

## 2025-02-20 LAB
ALBUMIN SERPL BCP-MCNC: 4 G/DL (ref 3.5–5.2)
ALP SERPL-CCNC: 101 U/L (ref 40–150)
ALT SERPL W/O P-5'-P-CCNC: 29 U/L (ref 10–44)
ANION GAP SERPL CALC-SCNC: 9 MMOL/L (ref 8–16)
AST SERPL-CCNC: 45 U/L (ref 10–40)
BASOPHILS # BLD AUTO: 0.08 K/UL (ref 0–0.2)
BASOPHILS NFR BLD: 1.1 % (ref 0–1.9)
BILIRUB SERPL-MCNC: 0.9 MG/DL (ref 0.1–1)
BUN SERPL-MCNC: 21 MG/DL (ref 8–23)
CALCIUM SERPL-MCNC: 10 MG/DL (ref 8.7–10.5)
CHLORIDE SERPL-SCNC: 102 MMOL/L (ref 95–110)
CHOLEST SERPL-MCNC: 157 MG/DL (ref 120–199)
CHOLEST/HDLC SERPL: 2.8 {RATIO} (ref 2–5)
CO2 SERPL-SCNC: 25 MMOL/L (ref 23–29)
CREAT SERPL-MCNC: 1.3 MG/DL (ref 0.5–1.4)
DIFFERENTIAL METHOD BLD: ABNORMAL
EOSINOPHIL # BLD AUTO: 0.5 K/UL (ref 0–0.5)
EOSINOPHIL NFR BLD: 7 % (ref 0–8)
ERYTHROCYTE [DISTWIDTH] IN BLOOD BY AUTOMATED COUNT: 13.3 % (ref 11.5–14.5)
EST. GFR  (NO RACE VARIABLE): 53.8 ML/MIN/1.73 M^2
ESTIMATED AVG GLUCOSE: 108 MG/DL (ref 68–131)
GLUCOSE SERPL-MCNC: 100 MG/DL (ref 70–110)
HBA1C MFR BLD: 5.4 % (ref 4–5.6)
HCT VFR BLD AUTO: 40.2 % (ref 40–54)
HDLC SERPL-MCNC: 56 MG/DL (ref 40–75)
HDLC SERPL: 35.7 % (ref 20–50)
HGB BLD-MCNC: 13.3 G/DL (ref 14–18)
IMM GRANULOCYTES # BLD AUTO: 0.05 K/UL (ref 0–0.04)
IMM GRANULOCYTES NFR BLD AUTO: 0.7 % (ref 0–0.5)
LDLC SERPL CALC-MCNC: 85 MG/DL (ref 63–159)
LYMPHOCYTES # BLD AUTO: 1.4 K/UL (ref 1–4.8)
LYMPHOCYTES NFR BLD: 19.1 % (ref 18–48)
MCH RBC QN AUTO: 36.2 PG (ref 27–31)
MCHC RBC AUTO-ENTMCNC: 33.1 G/DL (ref 32–36)
MCV RBC AUTO: 110 FL (ref 82–98)
MONOCYTES # BLD AUTO: 0.7 K/UL (ref 0.3–1)
MONOCYTES NFR BLD: 9.2 % (ref 4–15)
NEUTROPHILS # BLD AUTO: 4.5 K/UL (ref 1.8–7.7)
NEUTROPHILS NFR BLD: 62.9 % (ref 38–73)
NONHDLC SERPL-MCNC: 101 MG/DL
NRBC BLD-RTO: 0 /100 WBC
PLATELET # BLD AUTO: 210 K/UL (ref 150–450)
PMV BLD AUTO: 11.2 FL (ref 9.2–12.9)
POTASSIUM SERPL-SCNC: 4.3 MMOL/L (ref 3.5–5.1)
PROT SERPL-MCNC: 7.2 G/DL (ref 6–8.4)
RBC # BLD AUTO: 3.67 M/UL (ref 4.6–6.2)
SODIUM SERPL-SCNC: 136 MMOL/L (ref 136–145)
T4 FREE SERPL-MCNC: <0.42 NG/DL (ref 0.71–1.51)
TRIGL SERPL-MCNC: 80 MG/DL (ref 30–150)
TSH SERPL DL<=0.005 MIU/L-ACNC: 55.6 UIU/ML (ref 0.4–4)
WBC # BLD AUTO: 7.16 K/UL (ref 3.9–12.7)

## 2025-02-20 PROCEDURE — 36415 COLL VENOUS BLD VENIPUNCTURE: CPT | Performed by: STUDENT IN AN ORGANIZED HEALTH CARE EDUCATION/TRAINING PROGRAM

## 2025-02-20 PROCEDURE — 80061 LIPID PANEL: CPT | Performed by: STUDENT IN AN ORGANIZED HEALTH CARE EDUCATION/TRAINING PROGRAM

## 2025-02-20 PROCEDURE — 85025 COMPLETE CBC W/AUTO DIFF WBC: CPT | Performed by: STUDENT IN AN ORGANIZED HEALTH CARE EDUCATION/TRAINING PROGRAM

## 2025-02-20 PROCEDURE — 80053 COMPREHEN METABOLIC PANEL: CPT | Performed by: STUDENT IN AN ORGANIZED HEALTH CARE EDUCATION/TRAINING PROGRAM

## 2025-02-20 PROCEDURE — 84439 ASSAY OF FREE THYROXINE: CPT | Performed by: STUDENT IN AN ORGANIZED HEALTH CARE EDUCATION/TRAINING PROGRAM

## 2025-02-20 PROCEDURE — 84443 ASSAY THYROID STIM HORMONE: CPT | Performed by: STUDENT IN AN ORGANIZED HEALTH CARE EDUCATION/TRAINING PROGRAM

## 2025-02-20 PROCEDURE — 83036 HEMOGLOBIN GLYCOSYLATED A1C: CPT | Performed by: STUDENT IN AN ORGANIZED HEALTH CARE EDUCATION/TRAINING PROGRAM

## 2025-02-27 ENCOUNTER — RESULTS FOLLOW-UP (OUTPATIENT)
Dept: INTERNAL MEDICINE | Facility: CLINIC | Age: 85
End: 2025-02-27

## 2025-02-28 ENCOUNTER — LAB VISIT (OUTPATIENT)
Dept: LAB | Facility: HOSPITAL | Age: 85
End: 2025-02-28
Attending: STUDENT IN AN ORGANIZED HEALTH CARE EDUCATION/TRAINING PROGRAM
Payer: MEDICARE

## 2025-02-28 ENCOUNTER — OFFICE VISIT (OUTPATIENT)
Dept: INTERNAL MEDICINE | Facility: CLINIC | Age: 85
End: 2025-02-28
Payer: MEDICARE

## 2025-02-28 VITALS
HEART RATE: 67 BPM | BODY MASS INDEX: 30.62 KG/M2 | SYSTOLIC BLOOD PRESSURE: 108 MMHG | DIASTOLIC BLOOD PRESSURE: 60 MMHG | OXYGEN SATURATION: 97 % | WEIGHT: 218.69 LBS | HEIGHT: 71 IN

## 2025-02-28 DIAGNOSIS — I48.91 ATRIAL FIBRILLATION, UNSPECIFIED TYPE: ICD-10-CM

## 2025-02-28 DIAGNOSIS — R94.6 ABNORMAL THYROID FUNCTION TEST: ICD-10-CM

## 2025-02-28 DIAGNOSIS — D53.9 MACROCYTIC ANEMIA: ICD-10-CM

## 2025-02-28 DIAGNOSIS — N18.31 CHRONIC KIDNEY DISEASE, STAGE 3A: ICD-10-CM

## 2025-02-28 DIAGNOSIS — Z00.00 ANNUAL PHYSICAL EXAM: Primary | ICD-10-CM

## 2025-02-28 DIAGNOSIS — I50.30 NYHA CLASS 2 HEART FAILURE WITH PRESERVED EJECTION FRACTION: ICD-10-CM

## 2025-02-28 DIAGNOSIS — Z23 NEED FOR PNEUMOCOCCAL VACCINE: ICD-10-CM

## 2025-02-28 LAB
FOLATE SERPL-MCNC: 17 NG/ML (ref 4–24)
T4 FREE SERPL-MCNC: <0.42 NG/DL (ref 0.71–1.51)
TSH SERPL DL<=0.005 MIU/L-ACNC: 54.77 UIU/ML (ref 0.4–4)

## 2025-02-28 PROCEDURE — 84439 ASSAY OF FREE THYROXINE: CPT | Performed by: STUDENT IN AN ORGANIZED HEALTH CARE EDUCATION/TRAINING PROGRAM

## 2025-02-28 PROCEDURE — 99999 PR PBB SHADOW E&M-EST. PATIENT-LVL IV: CPT | Mod: PBBFAC,,, | Performed by: STUDENT IN AN ORGANIZED HEALTH CARE EDUCATION/TRAINING PROGRAM

## 2025-02-28 PROCEDURE — 82746 ASSAY OF FOLIC ACID SERUM: CPT | Performed by: STUDENT IN AN ORGANIZED HEALTH CARE EDUCATION/TRAINING PROGRAM

## 2025-02-28 PROCEDURE — 84443 ASSAY THYROID STIM HORMONE: CPT | Performed by: STUDENT IN AN ORGANIZED HEALTH CARE EDUCATION/TRAINING PROGRAM

## 2025-02-28 PROCEDURE — 82607 VITAMIN B-12: CPT | Performed by: STUDENT IN AN ORGANIZED HEALTH CARE EDUCATION/TRAINING PROGRAM

## 2025-02-28 NOTE — PROGRESS NOTES
SUBJECTIVE     Chief Complaint   Patient presents with    Follow-up    Dizziness       HPI  Miguel Sawant Jr. is a 85 y.o. male with  HTN, HLD, CAD, aortic valve stenosis s/p TAVR, HFpEF, paroxysmal atrial fibrilaltion, CKD3a  that presents for follow-up and for evaluation of dizziness.    WEIGHT AND FLUID STATUS:  He reports weight increase from baseline of 200-205 lbs to current weight of 211-215 lbs. He notes fluid retention around midsection without feet swelling.    CARDIOVASCULAR HISTORY:  He has a history of hypertension for 20 years, currently well-controlled. He can subjectively sense when his blood pressure is elevated. In March 2021, he underwent stent placement and developed an irregular heartbeat that persisted through summer. He required two cardioversions; the first was unsuccessful, but the second achieved complete resolution of arrhythmia. He denies current cardiac symptoms. He maintains regular cardiology follow-up every couple months.    CURRENT MEDICATIONS:  He takes Furosemide and Spironolactone on Monday, Wednesday, and Friday for HFpEF. His evening medications include Eliquis and Lipitor. He takes half dose of Amiodarone daily for atrial fibrillation, reduced from previous full dose. Follows with Dr. Queen for cardiology.    IMMUNIZATIONS:  He has received the shingles vaccine and reports last flu vaccine at [facility] in either 2020 or 2021.     Blood work in 2/2025 showing macrocytic anemai with Hg 12.3, HCT 40.2, . Recent thyroid function showing hypothyroidism: TSH 55.599 uIU/mL, FT4 <0.42 ng/dL.       PAST MEDICAL HISTORY:  Past Medical History:   Diagnosis Date    Blood transfusion     Heart murmur     Hypertension     S/P TAVR (transcatheter aortic valve replacement)        PAST SURGICAL HISTORY:  Past Surgical History:   Procedure Laterality Date    JOINT REPLACEMENT      left    KNEE ARTHROSCOPY      LEFT HEART CATHETERIZATION Right 7/30/2021    Procedure: CATHETERIZATION,  "HEART, LEFT LV MEGHNA POSSIBLE;  Surgeon: Paco Queen MD;  Location: [facility] CATH LAB;  Service: Cardiology;  Laterality: Right;    RIGHT HEART CATHETERIZATION Right 7/30/2021    Procedure: INSERTION, CATHETER, RIGHT HEART LV MEGHNA POSSIBLE;  Surgeon: Paco Queen MD;  Location: [facility] CATH LAB;  Service: Cardiology;  Laterality: Right;    TONSILLECTOMY      TOTAL KNEE ARTHROPLASTY Right 4/22/2024    Procedure: ARTHROPLASTY, KNEE, TOTAL;  Surgeon: Wade Soriano MD;  Location: [facility] OR;  Service: Orthopedics;  Laterality: Right;       FAMILY HISTORY:  Family History   Problem Relation Name Age of Onset    Cancer Father      COPD Father         ALLERGIES AND MEDICATIONS: updated and reviewed.  Review of patient's allergies indicates:  No Known Allergies  Current Medications[1]    ROS  Review of Systems   All other systems reviewed and are negative.        OBJECTIVE     Physical Exam  Vitals:    02/28/25 1343   BP: 108/60   Pulse: 67    Body mass index is 30.5 kg/m².  Weight: 99.2 kg (218 lb 11.1 oz)   Height: 5' 11" (180.3 cm)     Physical Exam      ASSESSMENT     85 y.o. male with     1. Annual physical exam    2. Abnormal thyroid function test    3. Macrocytic anemia    4. Need for pneumococcal vaccine    5. Chronic kidney disease, stage 3a    6. NYHA class 2 heart failure with preserved ejection fraction    7. Atrial fibrillation, unspecified type        PLAN:     1. Annual physical exam  - Discussed age and gender appropriate screenings at this visit and encouraged a healthy diet low in simple carbohydrates, and increased physical activity.  Counseled on medically appropriate vaccines based on age and current health condition.  Screening test reviewed and discussed with patient.     2. Abnormal thyroid function test  - Repeat tests, if still indicative of hypothyroidism, start patient on Levothyroxine.   - TSH; Future  - T4, Free; Future    3. Macrocytic anemia  - Check B12, Folate levels. Repeat thyroid " function also pending.   - Vitamin B12 Deficiency Panel; Future  - FOLATE; Future    4. Need for pneumococcal vaccine  - pneumoc 20-vic conj-dip cr(PF) (PREVNAR-20 (PF)) injection Syrg 0.5 mL    5. Chronic kidney disease, stage 3a  - Stable. Ensure good hydration. Limit systemic NSAID use.     6. NYHA class 2 heart failure with preserved ejection fraction  - Stable on spironolactone, ramipril, metoprolol succinate, furosemide as managed by cardiology. Continue follow up.     7. Atrial fibrillation, unspecified type  - Stable on Eliquis, Amiodarone, and Metoprolol succinate as managed by cardiology. Continue follow up.       RTC in 6 months       Earnest Sanford MD  Family Medicine  [facility]  02/28/2025    This note was generated with the assistance of ambient listening technology. Verbal consent was obtained by the patient and accompanying visitor(s) for the recording of patient appointment to facilitate this note. I attest to having reviewed and edited the generated note for accuracy, though some syntax or spelling errors may persist. Please contact the author of this note for any clarification.      Follow up in about 6 months (around 8/28/2025) for Check up .                       [1]   Current Outpatient Medications   Medication Sig Dispense Refill    amiodarone (PACERONE) 200 MG Tab Take 100 mg by mouth once daily.      apixaban (ELIQUIS) 2.5 mg Tab Start 1 tablet by mouth at 9pm tonight, then 1 tablet twice daily for 2 day, then resume 2 tablets twice daily 5 tablet 0    aspirin (ECOTRIN) 81 MG EC tablet Take 81 mg by mouth once daily.        atorvastatin (LIPITOR) 20 MG tablet TAKE 1 TABLET DAILY 90 tablet 4    furosemide (LASIX) 40 MG tablet Take 40 mg by mouth 2 (two) times daily.      metoprolol succinate (TOPROL-XL) 25 MG 24 hr tablet TAKE 1 TABLET DAILY 90 tablet 3    MULTIVITAMIN W-MINERALS/LUTEIN (CENTRUM SILVER ORAL) Take by mouth once daily.        ramipriL  (ALTACE) 10 MG capsule TAKE 1 CAPSULE DAILY 90 capsule 3    spironolactone (ALDACTONE) 25 MG tablet Take 25 mg by mouth.       Current Facility-Administered Medications   Medication Dose Route Frequency Provider Last Rate Last Admin    pneumoc 20-vic conj-dip cr(PF) (PREVNAR-20 (PF)) injection Syrg 0.5 mL  0.5 mL Intramuscular 1 time in Clinic/HOD Earnest Sanford MD

## 2025-03-03 LAB — VIT B12 SERPL-MCNC: >1400 NG/L (ref 180–914)

## 2025-03-06 ENCOUNTER — RESULTS FOLLOW-UP (OUTPATIENT)
Dept: INTERNAL MEDICINE | Facility: CLINIC | Age: 85
End: 2025-03-06

## 2025-03-06 DIAGNOSIS — E03.9 HYPOTHYROIDISM, UNSPECIFIED TYPE: Primary | ICD-10-CM

## 2025-03-06 RX ORDER — LEVOTHYROXINE SODIUM 50 UG/1
50 TABLET ORAL
Qty: 90 TABLET | Refills: 3 | Status: SHIPPED | OUTPATIENT
Start: 2025-03-06 | End: 2026-03-06

## 2025-03-06 RX ORDER — LEVOTHYROXINE SODIUM 50 UG/1
50 TABLET ORAL
Qty: 90 TABLET | Refills: 3 | Status: SHIPPED | OUTPATIENT
Start: 2025-03-06 | End: 2025-03-06

## 2025-03-06 NOTE — TELEPHONE ENCOUNTER
I just spoke with the patient about his lab results and your recommendations. He verbalized understanding. He would like his medication sent to [facility]: [address] [address]. [phone]. And his appointment was scheduled for his TSH in 6weeks.

## 2025-03-06 NOTE — PROGRESS NOTES
Please call patient to let him know that his repeat testing confirmed hypothyroidism. I recommend starting thyroid hormone replacement therapy with Synthroid 50 micrograms. Take medicine in the morning by itself, wait 30 minutes before eating or taking other medications. We will recheck his levels in 6 weeks (orders for non-fasting labs already in). Let me know which pharmacy to send medication to.     Thanks,   TP

## 2025-03-06 NOTE — TELEPHONE ENCOUNTER
----- Message from Earnest Sanford MD sent at 3/6/2025  2:50 PM CST -----  Please call patient to let him know that his repeat testing confirmed hypothyroidism. I recommend starting thyroid hormone replacement therapy with Synthroid 50 micrograms. Take medicine in the   morning by itself, wait 30 minutes before eating or taking other medications. We will recheck his levels in 6 weeks (orders for non-fasting labs already in). Let me know which pharmacy to send   medication to.     Thanks,   TP    ----- Message -----  From: [facility] Interface  Sent: 2/28/2025  10:09 PM CST  To: Earnest Sanford MD

## 2025-03-25 ENCOUNTER — OFFICE VISIT (OUTPATIENT)
Dept: URGENT CARE | Facility: CLINIC | Age: 85
End: 2025-03-25
Payer: MEDICARE

## 2025-03-25 VITALS
DIASTOLIC BLOOD PRESSURE: 64 MMHG | RESPIRATION RATE: 18 BRPM | HEART RATE: 64 BPM | TEMPERATURE: 99 F | WEIGHT: 218 LBS | BODY MASS INDEX: 30.4 KG/M2 | SYSTOLIC BLOOD PRESSURE: 123 MMHG | OXYGEN SATURATION: 95 %

## 2025-03-25 DIAGNOSIS — T14.8XXA BLEEDING FROM WOUND: Primary | ICD-10-CM

## 2025-03-25 DIAGNOSIS — Z92.29 HISTORY OF ANTICOAGULANT USE: ICD-10-CM

## 2025-03-25 PROCEDURE — 99204 OFFICE O/P NEW MOD 45 MIN: CPT | Mod: S$GLB,,, | Performed by: NURSE PRACTITIONER

## 2025-03-25 RX ORDER — CHOLECALCIFEROL (VITAMIN D3) 50 MCG
TABLET ORAL
COMMUNITY

## 2025-03-25 RX ORDER — GLUCOSAMINE/CHONDRO SU A 500-400 MG
TABLET ORAL
COMMUNITY

## 2025-03-25 NOTE — PROGRESS NOTES
Subjective:      Patient ID: Miguel Sawant Jr. is a 85 y.o. male.    Vitals:  weight is 98.9 kg (218 lb). His oral temperature is 98.6 °F (37 °C). His blood pressure is 123/64 and his pulse is 64. His respiration is 18 and oxygen saturation is 95%.     Chief Complaint: Epistaxis    This is a 85 y.o. male who presents today with a chief complaint of  nose bleeding. Pt had a biopsy done yesterday with his dermatologist and through out the night the bandge somehow fell off and he woke up and has been dealing with the bleeding on and off all day today.    Epistaxis   This is a new problem. The current episode started today. He has experienced no nasal trauma. The problem occurs constantly. The problem has been unchanged. The bleeding is associated with nothing. He has tried nothing for the symptoms. The treatment provided no relief. There is no history of allergies, a bleeding disorder, colds, frequent nosebleeds or sinus problems.       HENT:  Positive for nosebleeds.    Skin:  Negative for erythema.   Hematologic/Lymphatic: Negative for history of bleeding disorder.      Objective:     Physical Exam   Constitutional: He is oriented to person, place, and time. He appears well-developed.   HENT:   Head: Normocephalic and atraumatic. Head is without abrasion, without contusion and without laceration.       Ears:   Right Ear: External ear normal.   Left Ear: External ear normal.   Nose: Nose normal.   Mouth/Throat: Oropharynx is clear and moist and mucous membranes are normal.   Eyes: Conjunctivae, EOM and lids are normal. Pupils are equal, round, and reactive to light.   Neck: Trachea normal and phonation normal. Neck supple.   Cardiovascular: Normal rate.   Pulmonary/Chest: Effort normal.   Musculoskeletal: Normal range of motion.         General: Normal range of motion.   Neurological: He is alert and oriented to person, place, and time.   Skin: Skin is warm, dry, intact and no rash. Capillary refill takes less than  2 seconds. No abrasion, No burn, No bruising, No erythema and No ecchymosis   Psychiatric: His speech is normal and behavior is normal. Judgment and thought content normal.   Nursing note and vitals reviewed.      Assessment:     1. Bleeding from wound    2. History of anticoagulant use        Plan:   MDM: 86yo male with bleeding skin lesion. Had a biopsy of his exterior left nose yesterday, with one suture placed. Since last night the lesion had been bleeding (started in middle of the night). He attempted pressure on it multiple times throughout day without success. He is not dizzy/weak, etc (blood loss symptoms). Since he already had a suture in place, I tried skin glue to see if I could slow it down, unsuccessfully. Discussed treatment with Dr VALENTINO Coffey and she suggested lidocaine with epi compress. Topical LET compressed for 10 minutes with resolution of bleeding. Skin glue placed over lesion to prevent further bleeding. Pt tolerated procedure well with NAD.    Bleeding from wound    History of anticoagulant use

## 2025-03-28 PROBLEM — N18.31 CHRONIC KIDNEY DISEASE, STAGE 3A: Status: ACTIVE | Noted: 2025-03-28

## 2025-04-17 ENCOUNTER — LAB VISIT (OUTPATIENT)
Dept: LAB | Facility: HOSPITAL | Age: 85
End: 2025-04-17
Attending: STUDENT IN AN ORGANIZED HEALTH CARE EDUCATION/TRAINING PROGRAM
Payer: MEDICARE

## 2025-04-17 DIAGNOSIS — E03.9 HYPOTHYROIDISM, UNSPECIFIED TYPE: ICD-10-CM

## 2025-04-17 LAB
T4 FREE SERPL-MCNC: 0.63 NG/DL (ref 0.71–1.51)
TSH SERPL-ACNC: 23.67 UIU/ML (ref 0.4–4)

## 2025-04-17 PROCEDURE — 84439 ASSAY OF FREE THYROXINE: CPT

## 2025-04-17 PROCEDURE — 84443 ASSAY THYROID STIM HORMONE: CPT

## 2025-04-17 PROCEDURE — 36415 COLL VENOUS BLD VENIPUNCTURE: CPT

## 2025-04-19 ENCOUNTER — RESULTS FOLLOW-UP (OUTPATIENT)
Dept: INTERNAL MEDICINE | Facility: CLINIC | Age: 85
End: 2025-04-19

## 2025-04-19 DIAGNOSIS — E03.9 HYPOTHYROIDISM, UNSPECIFIED TYPE: ICD-10-CM

## 2025-05-18 ENCOUNTER — OFFICE VISIT (OUTPATIENT)
Dept: URGENT CARE | Facility: CLINIC | Age: 85
End: 2025-05-18
Payer: MEDICARE

## 2025-05-18 VITALS
RESPIRATION RATE: 16 BRPM | OXYGEN SATURATION: 97 % | HEART RATE: 67 BPM | HEIGHT: 71 IN | DIASTOLIC BLOOD PRESSURE: 75 MMHG | TEMPERATURE: 98 F | WEIGHT: 218 LBS | BODY MASS INDEX: 30.52 KG/M2 | SYSTOLIC BLOOD PRESSURE: 144 MMHG

## 2025-05-18 DIAGNOSIS — S00.11XA CONTUSION OF RIGHT EYEBROW, INITIAL ENCOUNTER: ICD-10-CM

## 2025-05-18 DIAGNOSIS — Z92.29 HISTORY OF ANTICOAGULANT USE: ICD-10-CM

## 2025-05-18 DIAGNOSIS — S00.01XA ABRASION OF SCALP, INITIAL ENCOUNTER: Primary | ICD-10-CM

## 2025-05-18 DIAGNOSIS — Z95.3 S/P TAVR (TRANSCATHETER AORTIC VALVE REPLACEMENT): ICD-10-CM

## 2025-05-18 PROCEDURE — 99213 OFFICE O/P EST LOW 20 MIN: CPT | Mod: S$GLB,,, | Performed by: SURGERY

## 2025-05-18 NOTE — PROGRESS NOTES
"Subjective:      Patient ID: Miguel Sawant Jr. is a 85 y.o. male.    Vitals:  height is 5' 11" (1.803 m) and weight is 98.9 kg (218 lb). His oral temperature is 98.4 °F (36.9 °C). His blood pressure is 144/75 (abnormal) and his pulse is 67. His respiration is 16 and oxygen saturation is 97%.     Chief Complaint: Fall    Pt states Thursday night he fell backwards and hit his head, and also hit his right eye. He is worried about infection in the back of his head.     Fall  The accident occurred 3 to 5 days ago. The fall occurred while standing. The point of impact was the head. The pain is present in the head.     ROS   Objective:     Physical Exam   Constitutional: He is oriented to person, place, and time. He appears well-developed. He is cooperative.  Non-toxic appearance. He does not appear ill. No distress.   HENT:   Head: Normocephalic. Head is with abrasion and with contusion. Head is without laceration.       Ears:   Right Ear: Hearing, tympanic membrane, external ear and ear canal normal. No hemotympanum.   Left Ear: Hearing, tympanic membrane, external ear and ear canal normal. No hemotympanum.   Nose: Nose normal. No mucosal edema, rhinorrhea or nasal deformity. No epistaxis. Right sinus exhibits no maxillary sinus tenderness and no frontal sinus tenderness. Left sinus exhibits no maxillary sinus tenderness and no frontal sinus tenderness.   Mouth/Throat: Uvula is midline, oropharynx is clear and moist and mucous membranes are normal. No trismus in the jaw. Normal dentition. No uvula swelling. No posterior oropharyngeal erythema.   Eyes: Conjunctivae, EOM and lids are normal. Pupils are equal, round, and reactive to light. Right eye exhibits no discharge. Left eye exhibits no discharge. No scleral icterus.   Neck: Trachea normal and phonation normal. Neck supple. No tracheal deviation present. No neck rigidity present. No spinous process tenderness present. No muscular tenderness present. "   Cardiovascular: Normal rate, regular rhythm, normal heart sounds and normal pulses.   Pulmonary/Chest: Effort normal and breath sounds normal. No respiratory distress.   Abdominal: Normal appearance and bowel sounds are normal. He exhibits no distension and no mass. Soft. There is no abdominal tenderness.   Musculoskeletal: Normal range of motion.         General: No deformity. Normal range of motion.   Neurological: He is alert and oriented to person, place, and time. He has normal strength. No cranial nerve deficit or sensory deficit. He exhibits normal muscle tone. He displays no seizure activity. Coordination normal. GCS eye subscore is 4. GCS verbal subscore is 5. GCS motor subscore is 6.   Skin: Skin is warm, dry, intact, not diaphoretic and not pale. Capillary refill takes less than 2 seconds. No abrasion, No burn, No bruising and No ecchymosis   Psychiatric: His speech is normal and behavior is normal. Judgment and thought content normal.   Nursing note and vitals reviewed.            Assessment:     1. Abrasion of scalp, initial encounter    2. Contusion of right eyebrow, initial encounter    3. History of anticoagulant use    4. S/P TAVR (transcatheter aortic valve replacement)        Plan:       Abrasion of scalp, initial encounter    Contusion of right eyebrow, initial encounter    History of anticoagulant use    S/P TAVR (transcatheter aortic valve replacement)      Patient's injury occurred 3 days ago.  He has suffered no ill effects.  He denies headache nausea vomiting confusion dizziness.  I cautioned him regarding head injuries while taking anticoagulants.  Advised if it occurs again go to emergency room for CT scan.  At this point given normal neurologic status in the time that has passed CT scan is not recommended.  His main concern was avoiding infection being that it was an open wound.  I recommended wash with soap and water and apply antibiotic ointment until healed.  He has antibiotic  ointment prescribed by his dermatologist and will use that.

## 2025-05-27 RX ORDER — LEVOTHYROXINE SODIUM 50 UG/1
50 TABLET ORAL
Qty: 30 TABLET | Refills: 1 | Status: SHIPPED | OUTPATIENT
Start: 2025-05-27 | End: 2025-06-16

## 2025-06-12 NOTE — PROGRESS NOTES
INTERNAL MEDICINE PROGRESS/URGENT CARE NOTE    CHIEF COMPLAINT     Chief Complaint   Patient presents with    Follow-up       HPI     Miguel Sawant Jr. is a 85 y.o. male who presents for a follow up visit today.    Scheduled for cataract removal from left eye with Dr. Burkett at [facility] 6/17/25. Needs clearance faxed. He has no complaints or concerns today. Feels well. No cp or sob.     Hypothyroidism- started on 50 mcg of synthroid about 7 weeks ago. Tolerating well. His TSH previously was 23.6. Prior 54, 55 with FT4 less than 0.42.  Tolerating well. Taking it as prescribed.     Afib- managed by cards. On 100 mg amiodarone, eliquis, toprol.     HFpEF- on ACEI, aldactone, lasix. No cp or sob. No leg swelling.     He exercises regularly on stationary bike. No angina.     Problem List[1]     Past Medical History:  Past Medical History:   Diagnosis Date    Blood transfusion     Heart murmur     Hypertension     S/P TAVR (transcatheter aortic valve replacement)         Past Surgical History:  Past Surgical History:   Procedure Laterality Date    JOINT REPLACEMENT      left    KNEE ARTHROSCOPY      LEFT HEART CATHETERIZATION Right 7/30/2021    Procedure: CATHETERIZATION, HEART, LEFT LV MEGHNA POSSIBLE;  Surgeon: Paco Queen MD;  Location: [facility] CATH LAB;  Service: Cardiology;  Laterality: Right;    RIGHT HEART CATHETERIZATION Right 7/30/2021    Procedure: INSERTION, CATHETER, RIGHT HEART LV MEGHNA POSSIBLE;  Surgeon: Paco Queen MD;  Location: [facility] CATH LAB;  Service: Cardiology;  Laterality: Right;    TONSILLECTOMY      TOTAL KNEE ARTHROPLASTY Right 4/22/2024    Procedure: ARTHROPLASTY, KNEE, TOTAL;  Surgeon: Wade Soriano MD;  Location: [facility] OR;  Service: Orthopedics;  Laterality: Right;        Allergies:  Review of patient's allergies indicates:  No Known Allergies    Home Medications:  Current Medications[2]     Review of Systems:  Review of Systems   Constitutional:  Negative for fever.  "  HENT:  Negative for congestion and sore throat.    Respiratory:  Negative for cough and shortness of breath.    Cardiovascular:  Negative for chest pain, palpitations and leg swelling.   Gastrointestinal:  Negative for abdominal pain, nausea and vomiting.   Neurological:  Negative for dizziness, weakness and headaches.         PHYSICAL EXAM     Vitals:    06/13/25 1046   BP: 124/62   BP Location: Right arm   Patient Position: Sitting   Pulse: 77   SpO2: 95%   Weight: 96.8 kg (213 lb 6.5 oz)   Height: 5' 11" (1.803 m)      Body mass index is 29.76 kg/m².     Physical Exam  Vitals reviewed.   Constitutional:       Appearance: Normal appearance. He is obese.   HENT:      Head: Normocephalic.      Right Ear: Tympanic membrane and ear canal normal.      Left Ear: Tympanic membrane and ear canal normal.      Mouth/Throat:      Mouth: Mucous membranes are moist.      Pharynx: Oropharynx is clear.   Eyes:      Conjunctiva/sclera: Conjunctivae normal.      Pupils: Pupils are equal, round, and reactive to light.   Cardiovascular:      Rate and Rhythm: Normal rate and regular rhythm.   Pulmonary:      Effort: Pulmonary effort is normal.      Breath sounds: Normal breath sounds. No wheezing, rhonchi or rales.   Abdominal:      General: Bowel sounds are normal.      Palpations: Abdomen is soft.      Tenderness: There is no abdominal tenderness.   Musculoskeletal:      Right lower leg: No edema.      Left lower leg: No edema.   Skin:     General: Skin is warm and dry.   Neurological:      Mental Status: He is alert and oriented to person, place, and time.      Motor: No weakness.      Gait: Gait is intact.   Psychiatric:         Mood and Affect: Mood normal.         Behavior: Behavior normal.         LABS     Lab Results   Component Value Date    HGBA1C 5.4 02/20/2025     CMP  Sodium   Date Value Ref Range Status   02/20/2025 136 136 - 145 mmol/L Final     Potassium   Date Value Ref Range Status   02/20/2025 4.3 3.5 - 5.1 " mmol/L Final     Chloride   Date Value Ref Range Status   02/20/2025 102 95 - 110 mmol/L Final     CO2   Date Value Ref Range Status   02/20/2025 25 23 - 29 mmol/L Final     Glucose   Date Value Ref Range Status   02/20/2025 100 70 - 110 mg/dL Final     BUN   Date Value Ref Range Status   02/20/2025 21 8 - 23 mg/dL Final     Creatinine   Date Value Ref Range Status   02/20/2025 1.3 0.5 - 1.4 mg/dL Final     Calcium   Date Value Ref Range Status   02/20/2025 10.0 8.7 - 10.5 mg/dL Final     Total Protein   Date Value Ref Range Status   02/20/2025 7.2 6.0 - 8.4 g/dL Final     Albumin   Date Value Ref Range Status   02/20/2025 4.0 3.5 - 5.2 g/dL Final     Total Bilirubin   Date Value Ref Range Status   02/20/2025 0.9 0.1 - 1.0 mg/dL Final     Comment:     For infants and newborns, interpretation of results should be based  on gestational age, weight and in agreement with clinical  observations.    Premature Infant recommended reference ranges:  Up to 24 hours.............<8.0 mg/dL  Up to 48 hours............<12.0 mg/dL  3-5 days..................<15.0 mg/dL  6-29 days.................<15.0 mg/dL       Alkaline Phosphatase   Date Value Ref Range Status   02/20/2025 101 40 - 150 U/L Final     AST   Date Value Ref Range Status   02/20/2025 45 (H) 10 - 40 U/L Final     ALT   Date Value Ref Range Status   02/20/2025 29 10 - 44 U/L Final     Anion Gap   Date Value Ref Range Status   02/20/2025 9 8 - 16 mmol/L Final     eGFR if    Date Value Ref Range Status   07/26/2021 >60 >60 mL/min/1.73 m^2 Final     eGFR if non    Date Value Ref Range Status   07/26/2021 >60 >60 mL/min/1.73 m^2 Final     Comment:     Calculation used to obtain the estimated glomerular filtration  rate (eGFR) is the CKD-EPI equation.        Lab Results   Component Value Date    WBC 7.16 02/20/2025    HGB 13.3 (L) 02/20/2025    HCT 40.2 02/20/2025     (H) 02/20/2025     02/20/2025     Lab Results   Component  Value Date    CHOL 157 02/20/2025    CHOL 130 02/23/2023    CHOL 165 01/15/2014     Lab Results   Component Value Date    HDL 56 02/20/2025    HDL 53 02/23/2023    HDL 57 01/15/2014     Lab Results   Component Value Date    LDLCALC 85.0 02/20/2025    LDLCALC 67.4 02/23/2023    LDLCALC 90 01/15/2014     Lab Results   Component Value Date    TRIG 80 02/20/2025    TRIG 48 02/23/2023    TRIG 90 01/15/2014     Lab Results   Component Value Date    CHOLHDL 35.7 02/20/2025    CHOLHDL 40.8 02/23/2023    CHOLHDL 2.9 01/15/2014     Lab Results   Component Value Date    TSH 23.666 (H) 04/17/2025       ASSESSMENT     1. Hypothyroidism, unspecified type    2. Chronic kidney disease, stage 3a    3. NYHA class 2 heart failure with preserved ejection fraction    4. Atrial fibrillation, unspecified type    5. Senile cataract of left eye, unspecified age-related cataract type    6. Primary hypertension    7. S/P TAVR (transcatheter aortic valve replacement)    8. Coronary artery disease involving native coronary artery of native heart without angina pectoris    9. Hyperlipidemia, unspecified hyperlipidemia type           PLAN    1. Hypothyroidism, unspecified type  Recheck TSH today. Continue synthroid 50 mcg for now.   -     TSH; Future; Expected date: 06/13/2025  -     Basic Metabolic Panel; Future; Expected date: 06/13/2025    2. Chronic kidney disease, stage 3a  Has been stable. GFR 53. On ACEI. Avoid nephrotoxic meds  -     TSH; Future; Expected date: 06/13/2025  -     Basic Metabolic Panel; Future; Expected date: 06/13/2025    3. NYHA class 2 heart failure with preserved ejection fraction  Euvolemic on exam. Continue meds as mentioned above    4. Atrial fibrillation, unspecified type  Stable. On BB and eliquis. RRR today.   Overview:  Formatting of this note might be different from the original.  Added automatically from request for surgery [id number]      5. Senile cataract of left eye, unspecified age-related cataract type  Will  clear for low risk procedure under monitored anesthesia only    6. Primary hypertension  Stable. Continue current meds.     7. S/P TAVR (transcatheter aortic valve replacement)  Doing well. Follows with cards  Overview:  Last Assessment & Plan:   Formatting of this note might be different from the original.  As above      8. Coronary artery disease involving native coronary artery of native heart without angina pectoris  Stable. On ASA, statin. Follows with cards  Overview:  Mild CAD at angiography on 8/30/2011      9. Hyperlipidemia, unspecified hyperlipidemia type  Stable on lipitor. Continue.       Keep follow up as scheduled with PCP and specialists     Patient's plan/treatment was discussed including medications and possible side effects. Verbalized understanding of all instructions.     This note was partly generated with [employer] voice recognition software. I apologize for any possible typographical errors.          NNAMDI Webber  Department of Internal Medicine - [facility]  06/13/2025          [1]   Patient Active Problem List  Diagnosis    Aortic insufficiency    Hypertension    CAD (coronary artery disease)    Chronic dislocation of shoulder    Complete rotator cuff rupture of left shoulder    Degenerative tear of glenoid labrum of left shoulder    Nonrheumatic aortic valve stenosis    Aortic valve stenosis    NYHA class 2 heart failure with preserved ejection fraction    S/P TAVR (transcatheter aortic valve replacement)    Hyperlipidemia    Atrial fibrillation    Unilateral primary osteoarthritis, right knee    Chronic kidney disease, stage 3a    Hypothyroidism   [2]   Current Outpatient Medications:     amiodarone (PACERONE) 200 MG Tab, Take 100 mg by mouth once daily., Disp: , Rfl:     aspirin (ECOTRIN) 81 MG EC tablet, Take 81 mg by mouth once daily.  , Disp: , Rfl:     atorvastatin (LIPITOR) 20 MG tablet, TAKE 1 TABLET DAILY, Disp: 90 tablet, Rfl: 4    cholecalciferol, vitamin D3,  (VITAMIN D3) 50 mcg (2,000 unit) Tab, 1 tablet Orally Once a day, Disp: , Rfl:     furosemide (LASIX) 40 MG tablet, Take 40 mg by mouth 2 (two) times daily., Disp: , Rfl:     glucosamine-chondroitin (COSAMIN DS) 500-400 mg tablet, 1 tablet with a meal Orally Once a day for 30 day(s), Disp: , Rfl:     levothyroxine (SYNTHROID) 50 MCG tablet, Take 1 tablet (50 mcg total) by mouth before breakfast., Disp: 30 tablet, Rfl: 1    metoprolol succinate (TOPROL-XL) 25 MG 24 hr tablet, TAKE 1 TABLET DAILY, Disp: 90 tablet, Rfl: 3    MULTIVITAMIN W-MINERALS/LUTEIN (CENTRUM SILVER ORAL), Take by mouth once daily.  , Disp: , Rfl:     ramipriL (ALTACE) 10 MG capsule, TAKE 1 CAPSULE DAILY, Disp: 90 capsule, Rfl: 3    spironolactone (ALDACTONE) 25 MG tablet, Take 25 mg by mouth., Disp: , Rfl:     apixaban (ELIQUIS) 5 mg Tab, Take 5 mg by mouth 2 (two) times daily., Disp: , Rfl:

## 2025-06-13 ENCOUNTER — LAB VISIT (OUTPATIENT)
Dept: LAB | Facility: HOSPITAL | Age: 85
End: 2025-06-13
Payer: MEDICARE

## 2025-06-13 ENCOUNTER — OFFICE VISIT (OUTPATIENT)
Dept: INTERNAL MEDICINE | Facility: CLINIC | Age: 85
End: 2025-06-13
Payer: MEDICARE

## 2025-06-13 VITALS
OXYGEN SATURATION: 95 % | DIASTOLIC BLOOD PRESSURE: 62 MMHG | HEIGHT: 71 IN | SYSTOLIC BLOOD PRESSURE: 124 MMHG | HEART RATE: 77 BPM | BODY MASS INDEX: 29.87 KG/M2 | WEIGHT: 213.38 LBS

## 2025-06-13 DIAGNOSIS — H25.9 SENILE CATARACT OF LEFT EYE, UNSPECIFIED AGE-RELATED CATARACT TYPE: ICD-10-CM

## 2025-06-13 DIAGNOSIS — I25.10 CORONARY ARTERY DISEASE INVOLVING NATIVE CORONARY ARTERY OF NATIVE HEART WITHOUT ANGINA PECTORIS: ICD-10-CM

## 2025-06-13 DIAGNOSIS — Z95.3 S/P TAVR (TRANSCATHETER AORTIC VALVE REPLACEMENT): ICD-10-CM

## 2025-06-13 DIAGNOSIS — N18.31 CHRONIC KIDNEY DISEASE, STAGE 3A: ICD-10-CM

## 2025-06-13 DIAGNOSIS — E03.9 HYPOTHYROIDISM, UNSPECIFIED TYPE: ICD-10-CM

## 2025-06-13 DIAGNOSIS — E03.9 HYPOTHYROIDISM, UNSPECIFIED TYPE: Primary | ICD-10-CM

## 2025-06-13 DIAGNOSIS — E78.5 HYPERLIPIDEMIA, UNSPECIFIED HYPERLIPIDEMIA TYPE: ICD-10-CM

## 2025-06-13 DIAGNOSIS — I50.30 NYHA CLASS 2 HEART FAILURE WITH PRESERVED EJECTION FRACTION: ICD-10-CM

## 2025-06-13 DIAGNOSIS — I48.91 ATRIAL FIBRILLATION, UNSPECIFIED TYPE: ICD-10-CM

## 2025-06-13 DIAGNOSIS — I10 PRIMARY HYPERTENSION: ICD-10-CM

## 2025-06-13 LAB
ANION GAP (OHS): 9 MMOL/L (ref 8–16)
BUN SERPL-MCNC: 25 MG/DL (ref 8–23)
CALCIUM SERPL-MCNC: 9.7 MG/DL (ref 8.7–10.5)
CHLORIDE SERPL-SCNC: 98 MMOL/L (ref 95–110)
CO2 SERPL-SCNC: 27 MMOL/L (ref 23–29)
CREAT SERPL-MCNC: 1.2 MG/DL (ref 0.5–1.4)
GFR SERPLBLD CREATININE-BSD FMLA CKD-EPI: 59 ML/MIN/1.73/M2
GLUCOSE SERPL-MCNC: 78 MG/DL (ref 70–110)
POTASSIUM SERPL-SCNC: 4.7 MMOL/L (ref 3.5–5.1)
SODIUM SERPL-SCNC: 134 MMOL/L (ref 136–145)
T4 FREE SERPL-MCNC: 0.64 NG/DL (ref 0.71–1.51)
TSH SERPL-ACNC: 26.3 UIU/ML (ref 0.4–4)

## 2025-06-13 PROCEDURE — 84443 ASSAY THYROID STIM HORMONE: CPT

## 2025-06-13 PROCEDURE — 84439 ASSAY OF FREE THYROXINE: CPT

## 2025-06-13 PROCEDURE — 36415 COLL VENOUS BLD VENIPUNCTURE: CPT

## 2025-06-13 PROCEDURE — 80048 BASIC METABOLIC PNL TOTAL CA: CPT

## 2025-06-13 PROCEDURE — 99999 PR PBB SHADOW E&M-EST. PATIENT-LVL III: CPT | Mod: PBBFAC,,, | Performed by: NURSE PRACTITIONER

## 2025-06-16 ENCOUNTER — TELEPHONE (OUTPATIENT)
Dept: INTERNAL MEDICINE | Facility: CLINIC | Age: 85
End: 2025-06-16
Payer: MEDICARE

## 2025-06-16 ENCOUNTER — RESULTS FOLLOW-UP (OUTPATIENT)
Dept: INTERNAL MEDICINE | Facility: CLINIC | Age: 85
End: 2025-06-16

## 2025-06-16 DIAGNOSIS — E03.9 HYPOTHYROIDISM, UNSPECIFIED TYPE: Primary | ICD-10-CM

## 2025-06-16 RX ORDER — LEVOTHYROXINE SODIUM 75 UG/1
75 TABLET ORAL
Qty: 30 TABLET | Refills: 11 | Status: SHIPPED | OUTPATIENT
Start: 2025-06-16 | End: 2026-06-16

## 2025-06-16 RX ORDER — LEVOTHYROXINE SODIUM 75 UG/1
75 TABLET ORAL
Qty: 30 TABLET | Refills: 5 | Status: CANCELLED | OUTPATIENT
Start: 2025-06-16 | End: 2026-06-16

## 2025-06-16 NOTE — TELEPHONE ENCOUNTER
Copied from CRM #[id number]. Topic: General Inquiry - Patient Advice  >> Jun 16, 2025 11:35 AM Dontae wrote:  Name of Who is Calling: [facility]        What is the request in detail:calling to have medical clearance faxed over again because they did not receive it fax @ [phone]        Can the clinic reply by [facility]: no        [facility]: [phone]

## 2025-06-16 NOTE — TELEPHONE ENCOUNTER
:Yumiko said she needs pt clearance faxed over [phone] and needs it before 2pm                   Do we have any paperwork for him?

## 2025-06-16 NOTE — TELEPHONE ENCOUNTER
Please call pt and let him know that his thyroid level is still high. Near around where he was previously. Please confirm if he's taking this daily on empty stomach without any other meds. I believe it has been 7 weeks since he's been on it. If he is taking it as prescribed, I will need to increase his dose to 75 mcg daily. Please ask preferred pharmacy. Please let me know. Thanks!

## 2025-06-16 NOTE — TELEPHONE ENCOUNTER
Spoke with and he confirmed that he is taking his Synthroid medication on an empty stomach and has not missed a dose.[facility] is the patient's pharmacy

## 2025-06-16 NOTE — TELEPHONE ENCOUNTER
Pt's paperwork was faxed off and the clinic was called to verify that they received it. The assistant stated that she got it before lunch.

## 2025-06-16 NOTE — TELEPHONE ENCOUNTER
:Yumiko said she needs pt clearance faxed over [phone] and needs it before 2pm                   Do we have paperwork for him?

## 2025-06-16 NOTE — TELEPHONE ENCOUNTER
"Pt was bright this morning about 1100 pt became blunted in affect, c/o feeling paranoid, pt said, \"I'm afraid others are talking about me.\"  Pt refused the offer of anxiety PRN medication. Appeared to just need reinforcement that the paranoia was delusional thoughts and not reality based. Emotional support and reassurance given. Med compliant, attending groups, social most of the day.   " Spoke with patient. Instructed him on medication change:Increased dose to 75 mcg. Please stop the 50 mcg.   Mediaction was sent ti [facility] Need to repeat tsh in 6 weeks.Patient verbalized understanding.

## 2025-07-24 ENCOUNTER — OFFICE VISIT (OUTPATIENT)
Dept: URGENT CARE | Facility: CLINIC | Age: 85
End: 2025-07-24
Payer: MEDICARE

## 2025-07-24 VITALS
OXYGEN SATURATION: 95 % | TEMPERATURE: 98 F | BODY MASS INDEX: 29.82 KG/M2 | RESPIRATION RATE: 18 BRPM | SYSTOLIC BLOOD PRESSURE: 106 MMHG | WEIGHT: 213 LBS | HEART RATE: 67 BPM | HEIGHT: 71 IN | DIASTOLIC BLOOD PRESSURE: 62 MMHG

## 2025-07-24 DIAGNOSIS — L03.116 CELLULITIS OF LEFT LOWER EXTREMITY: Primary | ICD-10-CM

## 2025-07-24 PROCEDURE — 99213 OFFICE O/P EST LOW 20 MIN: CPT | Mod: S$GLB,,,

## 2025-07-24 RX ORDER — DOXYCYCLINE 100 MG/1
100 CAPSULE ORAL 2 TIMES DAILY
Qty: 14 CAPSULE | Refills: 0 | Status: SHIPPED | OUTPATIENT
Start: 2025-07-24 | End: 2025-07-31

## 2025-07-24 RX ORDER — MUPIROCIN 20 MG/G
OINTMENT TOPICAL 3 TIMES DAILY
Qty: 22 G | Refills: 0 | Status: SHIPPED | OUTPATIENT
Start: 2025-07-24

## 2025-07-24 NOTE — PATIENT INSTRUCTIONS
Wash wound daily with antimicrobial soap and water and apply ointment.  Take antibiotics as prescribed.    When do I need to call the doctor?   You have a fever of 100.4°F (38°C) or higher or chills.  The area becomes more red, swollen, or painful.  The redness or swelling spreads up your leg or arm or to a larger area.  The infected area is not better after 3 days of taking antibiotics.  You have new or worsening symptoms.  - Rest.    - Drink plenty of fluids.    - Acetaminophen (tylenol) or Ibuprofen (advil,motrin) as directed as needed for fever/pain. Avoid tylenol if you have a history of liver disease. Do not take ibuprofen if you have a history of GI bleeding, kidney disease, or if you take blood thinners.     - Follow up with your PCP or specialty clinic as directed in the next 1-2 weeks if not improved or as needed.  You can call [phone] to schedule an appointment with the appropriate provider.    - Go to the ER or seek medical attention immediately if you develop new or worsening symptoms.     - You must understand that you have received an Urgent Care treatment only and that you may be released before all of your medical problems are known or treated.   - You, the patient, will arrange for follow up care as instructed.   - If your condition worsens or fails to improve we recommend that you receive another evaluation at the ER immediately or contact your PCP to discuss your concerns or return here.

## 2025-07-24 NOTE — PROGRESS NOTES
"Subjective:      Patient ID: Miguel Sawant Jr. is a 85 y.o. male.    Vitals:  height is 5' 11" (1.803 m) and weight is 96.6 kg (213 lb). His oral temperature is 98.2 °F (36.8 °C). His blood pressure is 106/62 and his pulse is 67. His respiration is 18 and oxygen saturation is 95%.     Chief Complaint: Wound Check    85-year-old male history of CKD presents with redness, swelling to his left lower shin after he scraped his shin against hard pavement about 10 days ago while on vacation.  He has been cleaning it daily and applying Neosporin.  He denies any bony tenderness or fever.  No discharge.      Wound Check  He was originally treated 10 to 14 days ago. His temperature was unmeasured prior to arrival. There has been no drainage from the wound. There is new redness present. There is new swelling present. There is new pain present. He has no difficulty moving the affected extremity or digit.       Constitution: Negative for activity change, appetite change and chills.   HENT:  Negative for ear pain, ear discharge, foreign body in ear and tinnitus.    Neck: Negative for neck pain, neck stiffness and painful lymph nodes.   Cardiovascular:  Negative for chest trauma and chest pain.   Eyes:  Negative for eye trauma, foreign body in eye and eye discharge.   Respiratory:  Negative for sleep apnea and chest tightness.    Gastrointestinal:  Negative for abdominal trauma and abdominal pain.   Endocrine: hair loss and cold intolerance.   Genitourinary:  Negative for dysuria, frequency and urgency.   Musculoskeletal:  Negative for pain, trauma, joint pain, muscle cramps, muscle ache and history of spine disorder.   Skin:  Positive for erythema. Negative for color change, pale, rash, lesion, skin thickening/induration, puncture wound, bruising, abscess, avulsion and hives.   Allergic/Immunologic: Negative for environmental allergies, seasonal allergies, food allergies and hives.   Neurological:  Negative for dizziness, " history of vertigo, light-headedness, passing out, disorientation and altered mental status.   Hematologic/Lymphatic: Negative for swollen lymph nodes and easy bruising/bleeding. Does not bruise/bleed easily.   Psychiatric/Behavioral:  Negative for altered mental status, disorientation, confusion and agitation.       Objective:     Physical Exam   Constitutional: He is oriented to person, place, and time. He appears well-developed. He is cooperative. No distress.   HENT:   Head: Normocephalic and atraumatic.   Nose: Nose normal.   Mouth/Throat: Oropharynx is clear and moist and mucous membranes are normal.   Eyes: Conjunctivae and lids are normal.   Neck: Trachea normal and phonation normal. Neck supple.   Cardiovascular: Normal rate, regular rhythm, normal heart sounds and normal pulses.   Pulmonary/Chest: Effort normal and breath sounds normal.   Abdominal: Normal appearance and bowel sounds are normal. He exhibits no mass. Soft.   Musculoskeletal:         General: Swelling present. No tenderness, deformity or signs of injury.      Right lower leg: No edema.      Left lower leg: No edema.        Legs:    Neurological: He is alert and oriented to person, place, and time. He has normal strength and normal reflexes. No sensory deficit.   Skin: Skin is warm, dry, intact, not diaphoretic, not pale and no rash. erythema No bruising and No lesion no jaundice  Psychiatric: His speech is normal and behavior is normal. Judgment and thought content normal.   Nursing note and vitals reviewed.      Assessment:     1. Cellulitis of left lower extremity        Plan:       Cellulitis of left lower extremity  -     doxycycline (VIBRAMYCIN) 100 MG Cap; Take 1 capsule (100 mg total) by mouth 2 (two) times daily. for 7 days  Dispense: 14 capsule; Refill: 0  -     mupirocin (BACTROBAN) 2 % ointment; Apply topically 3 (three) times daily. Apply to wound infection  Dispense: 22 g; Refill: 0

## 2025-07-26 ENCOUNTER — OFFICE VISIT (OUTPATIENT)
Dept: URGENT CARE | Facility: CLINIC | Age: 85
End: 2025-07-26
Payer: MEDICARE

## 2025-07-26 VITALS
RESPIRATION RATE: 18 BRPM | WEIGHT: 213 LBS | BODY MASS INDEX: 29.82 KG/M2 | SYSTOLIC BLOOD PRESSURE: 145 MMHG | HEIGHT: 71 IN | HEART RATE: 78 BPM | DIASTOLIC BLOOD PRESSURE: 78 MMHG | OXYGEN SATURATION: 95 % | TEMPERATURE: 98 F

## 2025-07-26 DIAGNOSIS — S61.211A LACERATION OF LEFT INDEX FINGER WITHOUT FOREIGN BODY WITHOUT DAMAGE TO NAIL, INITIAL ENCOUNTER: Primary | ICD-10-CM

## 2025-07-26 PROCEDURE — 90471 IMMUNIZATION ADMIN: CPT | Mod: S$GLB,,,

## 2025-07-26 PROCEDURE — 90715 TDAP VACCINE 7 YRS/> IM: CPT | Mod: S$GLB,,,

## 2025-07-26 PROCEDURE — 99213 OFFICE O/P EST LOW 20 MIN: CPT | Mod: 25,S$GLB,,

## 2025-07-26 NOTE — PROGRESS NOTES
"Subjective:      Patient ID: Miguel Sawant Jr. is a 85 y.o. male.    Vitals:  height is 5' 11" (1.803 m) and weight is 96.6 kg (213 lb). His oral temperature is 98 °F (36.7 °C). His blood pressure is 145/78 (abnormal) and his pulse is 78. His respiration is 18 and oxygen saturation is 95%.     Chief Complaint: Laceration    Pt was sawing a tree and the saw slipped and cut his left index finger yesterday. States the wound will not stop bleeding. Denies any tingling or numbness.     Laceration   The incident occurred 12 to 24 hours ago. The laceration is located on the Left hand. The laceration mechanism was a metal edge. The pain is at a severity of 2/10. The pain is mild. The pain has been Constant since onset. He reports no foreign bodies present. His tetanus status is out of date.       Skin:  Positive for laceration. Negative for erythema.   Neurological:  Negative for disorientation and altered mental status.   Psychiatric/Behavioral:  Negative for altered mental status and disorientation.       Objective:     Physical Exam   Constitutional: He is oriented to person, place, and time. He appears well-developed.   HENT:   Head: Normocephalic and atraumatic. Head is without abrasion, without contusion and without laceration.   Ears:   Right Ear: External ear normal.   Left Ear: External ear normal.   Nose: Nose normal.   Mouth/Throat: Oropharynx is clear and moist and mucous membranes are normal.   Eyes: Conjunctivae, EOM and lids are normal. Pupils are equal, round, and reactive to light.   Neck: Trachea normal and phonation normal. Neck supple.   Cardiovascular: Normal rate, regular rhythm and normal heart sounds.   Pulmonary/Chest: Effort normal and breath sounds normal. No stridor. No respiratory distress.   Musculoskeletal: Normal range of motion.         General: Normal range of motion.        Hands:    Neurological: He is alert and oriented to person, place, and time.   Skin: Skin is warm, dry, intact and " no rash. Capillary refill takes less than 2 seconds. No abrasion, No burn, No bruising, No erythema and No ecchymosis   Psychiatric: His speech is normal and behavior is normal. Judgment and thought content normal.   Nursing note and vitals reviewed.      Assessment:     1. Laceration of left index finger without foreign body without damage to nail, initial encounter        Plan:       Laceration of left index finger without foreign body without damage to nail, initial encounter  -     DIPH,PERTUSS(ACEL),TET VAC(PF)(ADULT)(ADACEL)(TDaP)                Patient Instructions   WOUND CARE:  - Clean wound 2 x per day with warm water and a mild soap (dial, dove, cetaphil). DO NOT USE PEROXIDE TO CLEAN THE AREA.  After cleaning wound apply antibiotic ointment to area. If wound becomes dirty clean it immediately.   - Keep wound covered when going out in public or going into a dirty space.   - If you start to noticed increased redness or swelling around the wound, you should return to urgent care, go to the emergency room, or promptly follow up with primary care.     - Continue Doxycycline as prescribed.   - Can use bactroban that you were prescribed the other day on the finger.     - Rest.    - Drink plenty of fluids.    - Acetaminophen (tylenol) as directed as needed for fever/pain. Avoid tylenol if you have a history of liver disease.   - Ibuprofen dosing for adults: 400 mg by mouth every 4-6 hours as needed. Max: 2400 mg/day; Info: use lowest effective dose, shortest effective treatment duration; give w/ food if GI upset occurs.    - You must understand that you have received an Urgent Care treatment only and that you may be released before all of your medical problems are known or treated.   - You, the patient, will arrange for follow up care as instructed.   - If your condition worsens or fails to improve we recommend that you receive another evaluation at the ER immediately or contact your PCP to discuss your concerns  or return here.   - Follow up with your PCP or specialty clinic as directed in the next 1-2 weeks if not improved or as needed.  You can call [phone] to schedule an appointment with the appropriate provider.    If your symptoms do not improve or worsen, go to the emergency room immediately.  \

## 2025-07-26 NOTE — PATIENT INSTRUCTIONS
WOUND CARE:  - Clean wound 2 x per day with warm water and a mild soap (dial, dove, cetaphil). DO NOT USE PEROXIDE TO CLEAN THE AREA.  After cleaning wound apply antibiotic ointment to area. If wound becomes dirty clean it immediately.   - Keep wound covered when going out in public or going into a dirty space.   - If you start to noticed increased redness or swelling around the wound, you should return to urgent care, go to the emergency room, or promptly follow up with primary care.     - Continue Doxycycline as prescribed.   - Can use bactroban that you were prescribed the other day on the finger.     - Rest.    - Drink plenty of fluids.    - Acetaminophen (tylenol) as directed as needed for fever/pain. Avoid tylenol if you have a history of liver disease.   - Ibuprofen dosing for adults: 400 mg by mouth every 4-6 hours as needed. Max: 2400 mg/day; Info: use lowest effective dose, shortest effective treatment duration; give w/ food if GI upset occurs.    - You must understand that you have received an Urgent Care treatment only and that you may be released before all of your medical problems are known or treated.   - You, the patient, will arrange for follow up care as instructed.   - If your condition worsens or fails to improve we recommend that you receive another evaluation at the ER immediately or contact your PCP to discuss your concerns or return here.   - Follow up with your PCP or specialty clinic as directed in the next 1-2 weeks if not improved or as needed.  You can call [phone] to schedule an appointment with the appropriate provider.    If your symptoms do not improve or worsen, go to the emergency room immediately.

## 2025-07-28 ENCOUNTER — LAB VISIT (OUTPATIENT)
Dept: LAB | Facility: HOSPITAL | Age: 85
End: 2025-07-28
Payer: MEDICARE

## 2025-07-28 ENCOUNTER — OFFICE VISIT (OUTPATIENT)
Dept: INTERNAL MEDICINE | Facility: CLINIC | Age: 85
End: 2025-07-28
Payer: MEDICARE

## 2025-07-28 VITALS
DIASTOLIC BLOOD PRESSURE: 54 MMHG | SYSTOLIC BLOOD PRESSURE: 126 MMHG | OXYGEN SATURATION: 98 % | HEART RATE: 75 BPM | BODY MASS INDEX: 30.62 KG/M2 | HEIGHT: 71 IN | WEIGHT: 218.69 LBS

## 2025-07-28 DIAGNOSIS — I50.9 CONGESTIVE HEART FAILURE, UNSPECIFIED HF CHRONICITY, UNSPECIFIED HEART FAILURE TYPE: ICD-10-CM

## 2025-07-28 DIAGNOSIS — I48.91 ATRIAL FIBRILLATION, UNSPECIFIED TYPE: ICD-10-CM

## 2025-07-28 DIAGNOSIS — N18.31 CHRONIC KIDNEY DISEASE, STAGE 3A: ICD-10-CM

## 2025-07-28 DIAGNOSIS — S89.92XA INJURY OF LEFT LOWER EXTREMITY, INITIAL ENCOUNTER: ICD-10-CM

## 2025-07-28 DIAGNOSIS — E03.9 HYPOTHYROIDISM, UNSPECIFIED TYPE: ICD-10-CM

## 2025-07-28 DIAGNOSIS — E03.9 HYPOTHYROIDISM, UNSPECIFIED TYPE: Primary | ICD-10-CM

## 2025-07-28 LAB
ABSOLUTE EOSINOPHIL (OHS): 0.46 K/UL
ABSOLUTE MONOCYTE (OHS): 0.76 K/UL (ref 0.3–1)
ABSOLUTE NEUTROPHIL COUNT (OHS): 4.49 K/UL (ref 1.8–7.7)
ALBUMIN SERPL BCP-MCNC: 3.9 G/DL (ref 3.5–5.2)
ALP SERPL-CCNC: 81 UNIT/L (ref 40–150)
ALT SERPL W/O P-5'-P-CCNC: 30 UNIT/L (ref 0–55)
ANION GAP (OHS): 10 MMOL/L (ref 8–16)
AST SERPL-CCNC: 34 UNIT/L (ref 0–50)
BASOPHILS # BLD AUTO: 0.07 K/UL
BASOPHILS NFR BLD AUTO: 1.1 %
BILIRUB SERPL-MCNC: 0.9 MG/DL (ref 0.1–1)
BUN SERPL-MCNC: 25 MG/DL (ref 8–23)
CALCIUM SERPL-MCNC: 9.4 MG/DL (ref 8.7–10.5)
CHLORIDE SERPL-SCNC: 100 MMOL/L (ref 95–110)
CO2 SERPL-SCNC: 22 MMOL/L (ref 23–29)
CREAT SERPL-MCNC: 1.3 MG/DL (ref 0.5–1.4)
ERYTHROCYTE [DISTWIDTH] IN BLOOD BY AUTOMATED COUNT: 13.9 % (ref 11.5–14.5)
GFR SERPLBLD CREATININE-BSD FMLA CKD-EPI: 54 ML/MIN/1.73/M2
GLUCOSE SERPL-MCNC: 104 MG/DL (ref 70–110)
HCT VFR BLD AUTO: 39.2 % (ref 40–54)
HGB BLD-MCNC: 13.2 GM/DL (ref 14–18)
IMM GRANULOCYTES # BLD AUTO: 0.03 K/UL (ref 0–0.04)
IMM GRANULOCYTES NFR BLD AUTO: 0.5 % (ref 0–0.5)
LYMPHOCYTES # BLD AUTO: 0.83 K/UL (ref 1–4.8)
MCH RBC QN AUTO: 35.4 PG (ref 27–31)
MCHC RBC AUTO-ENTMCNC: 33.7 G/DL (ref 32–36)
MCV RBC AUTO: 105 FL (ref 82–98)
NUCLEATED RBC (/100WBC) (OHS): 0 /100 WBC
PLATELET # BLD AUTO: 231 K/UL (ref 150–450)
PMV BLD AUTO: 11.2 FL (ref 9.2–12.9)
POTASSIUM SERPL-SCNC: 5.1 MMOL/L (ref 3.5–5.1)
PROT SERPL-MCNC: 7 GM/DL (ref 6–8.4)
RBC # BLD AUTO: 3.73 M/UL (ref 4.6–6.2)
RELATIVE EOSINOPHIL (OHS): 6.9 %
RELATIVE LYMPHOCYTE (OHS): 12.5 % (ref 18–48)
RELATIVE MONOCYTE (OHS): 11.4 % (ref 4–15)
RELATIVE NEUTROPHIL (OHS): 67.6 % (ref 38–73)
SODIUM SERPL-SCNC: 132 MMOL/L (ref 136–145)
T4 FREE SERPL-MCNC: 0.69 NG/DL (ref 0.71–1.51)
T4 SERPL-MCNC: 4.1 UG/DL (ref 4.5–11.5)
TSH SERPL-ACNC: 18.83 UIU/ML (ref 0.4–4)
WBC # BLD AUTO: 6.64 K/UL (ref 3.9–12.7)

## 2025-07-28 PROCEDURE — 85025 COMPLETE CBC W/AUTO DIFF WBC: CPT

## 2025-07-28 PROCEDURE — 84436 ASSAY OF TOTAL THYROXINE: CPT

## 2025-07-28 PROCEDURE — 99214 OFFICE O/P EST MOD 30 MIN: CPT | Mod: S$GLB,,, | Performed by: NURSE PRACTITIONER

## 2025-07-28 PROCEDURE — 84439 ASSAY OF FREE THYROXINE: CPT

## 2025-07-28 PROCEDURE — 36415 COLL VENOUS BLD VENIPUNCTURE: CPT

## 2025-07-28 PROCEDURE — 80053 COMPREHEN METABOLIC PANEL: CPT

## 2025-07-28 PROCEDURE — 1126F AMNT PAIN NOTED NONE PRSNT: CPT | Mod: CPTII,S$GLB,, | Performed by: NURSE PRACTITIONER

## 2025-07-28 PROCEDURE — 84443 ASSAY THYROID STIM HORMONE: CPT

## 2025-07-28 PROCEDURE — 1159F MED LIST DOCD IN RCRD: CPT | Mod: CPTII,S$GLB,, | Performed by: NURSE PRACTITIONER

## 2025-07-28 PROCEDURE — 1101F PT FALLS ASSESS-DOCD LE1/YR: CPT | Mod: CPTII,S$GLB,, | Performed by: NURSE PRACTITIONER

## 2025-07-28 PROCEDURE — 3288F FALL RISK ASSESSMENT DOCD: CPT | Mod: CPTII,S$GLB,, | Performed by: NURSE PRACTITIONER

## 2025-07-28 PROCEDURE — 99999 PR PBB SHADOW E&M-EST. PATIENT-LVL IV: CPT | Mod: PBBFAC,,, | Performed by: NURSE PRACTITIONER

## 2025-07-28 RX ORDER — SPIRONOLACTONE 25 MG/1
25 TABLET ORAL
COMMUNITY

## 2025-07-28 RX ORDER — FUROSEMIDE 40 MG/1
40 TABLET ORAL
COMMUNITY
Start: 2025-06-23

## 2025-07-28 NOTE — PROGRESS NOTES
INTERNAL MEDICINE PROGRESS/URGENT CARE NOTE    CHIEF COMPLAINT     Chief Complaint   Patient presents with    Follow-up       HPI     Miguel Sawant Jr. is a 85 y.o. male who presents for a follow up visit today.    Had him f/u today for TSH recheck. Increased his synthroid to 75 mcg daily after slightly worsening tsh 6 weeks ago. He is on 100 mg of amiodarone for afib daily He states he feels okay overall but has been having worsening issues with BAINS and afib. Has been seeing his cardiologist at [facility] regarding this and is scheduled for ablation 8/19.  Still going to [facility] [employer], exercises three times a day. Still pretty active.  Does have some fatigue intermittently    Of note he injured his left shin on a curb while out of town about 10 days ago.  Went to urgent Care when he got here was given mupirocin and doxycycline.  States wound looks a lot better.  No pain no fever chills or body aches.    Problem List[1]     Past Medical History:  Past Medical History:   Diagnosis Date    Blood transfusion     Heart murmur     Hypertension     S/P TAVR (transcatheter aortic valve replacement)         Past Surgical History:  Past Surgical History:   Procedure Laterality Date    JOINT REPLACEMENT      left    KNEE ARTHROSCOPY      LEFT HEART CATHETERIZATION Right 7/30/2021    Procedure: CATHETERIZATION, HEART, LEFT LV MEGHNA POSSIBLE;  Surgeon: Paco Queen MD;  Location: [facility] CATH LAB;  Service: Cardiology;  Laterality: Right;    RIGHT HEART CATHETERIZATION Right 7/30/2021    Procedure: INSERTION, CATHETER, RIGHT HEART LV MEGHNA POSSIBLE;  Surgeon: Paco Queen MD;  Location: [facility] CATH LAB;  Service: Cardiology;  Laterality: Right;    TONSILLECTOMY      TOTAL KNEE ARTHROPLASTY Right 4/22/2024    Procedure: ARTHROPLASTY, KNEE, TOTAL;  Surgeon: Wade Soriano MD;  Location: [facility] OR;  Service: Orthopedics;  Laterality: Right;        Allergies:  Review of patient's allergies indicates:  No Known  "Allergies    Home Medications:  Current Medications[2]     Review of Systems:  Review of Systems   Constitutional:  Positive for fatigue. Negative for fever.   Respiratory:  Positive for shortness of breath. Negative for cough and chest tightness.    Cardiovascular:  Negative for chest pain and leg swelling.   Neurological:  Negative for weakness and headaches.         PHYSICAL EXAM     Vitals:    07/28/25 0838   BP: (!) 126/54   BP Location: Right arm   Patient Position: Sitting   Pulse: 75   SpO2: 98%   Weight: 99.2 kg (218 lb 11.1 oz)   Height: 5' 11" (1.803 m)      Body mass index is 30.5 kg/m².     Physical Exam  Vitals reviewed.   Constitutional:       Appearance: Normal appearance.   HENT:      Head: Normocephalic.   Eyes:      Conjunctiva/sclera: Conjunctivae normal.   Cardiovascular:      Rate and Rhythm: Normal rate and regular rhythm.   Pulmonary:      Effort: Pulmonary effort is normal.      Breath sounds: Normal breath sounds.   Musculoskeletal:      Comments: A few abrasions noted to left shin.  Trace erythema.  No induration or discharge.   Skin:     General: Skin is warm and dry.   Neurological:      Mental Status: He is alert and oriented to person, place, and time.   Psychiatric:         Mood and Affect: Mood normal.         Behavior: Behavior normal.         LABS     Lab Results   Component Value Date    HGBA1C 5.4 02/20/2025     CMP  Sodium   Date Value Ref Range Status   06/13/2025 134 (L) 136 - 145 mmol/L Final   02/20/2025 136 136 - 145 mmol/L Final     Potassium   Date Value Ref Range Status   06/13/2025 4.7 3.5 - 5.1 mmol/L Final   02/20/2025 4.3 3.5 - 5.1 mmol/L Final     Chloride   Date Value Ref Range Status   06/13/2025 98 95 - 110 mmol/L Final   02/20/2025 102 95 - 110 mmol/L Final     CO2   Date Value Ref Range Status   06/13/2025 27 23 - 29 mmol/L Final   02/20/2025 25 23 - 29 mmol/L Final     Glucose   Date Value Ref Range Status   06/13/2025 78 70 - 110 mg/dL Final   02/20/2025 100 " 70 - 110 mg/dL Final     BUN   Date Value Ref Range Status   06/13/2025 25 (H) 8 - 23 mg/dL Final     Creatinine   Date Value Ref Range Status   06/13/2025 1.2 0.5 - 1.4 mg/dL Final     Calcium   Date Value Ref Range Status   06/13/2025 9.7 8.7 - 10.5 mg/dL Final   02/20/2025 10.0 8.7 - 10.5 mg/dL Final     Total Protein   Date Value Ref Range Status   02/20/2025 7.2 6.0 - 8.4 g/dL Final     Albumin   Date Value Ref Range Status   02/20/2025 4.0 3.5 - 5.2 g/dL Final     Total Bilirubin   Date Value Ref Range Status   02/20/2025 0.9 0.1 - 1.0 mg/dL Final     Comment:     For infants and newborns, interpretation of results should be based  on gestational age, weight and in agreement with clinical  observations.    Premature Infant recommended reference ranges:  Up to 24 hours.............<8.0 mg/dL  Up to 48 hours............<12.0 mg/dL  3-5 days..................<15.0 mg/dL  6-29 days.................<15.0 mg/dL       Alkaline Phosphatase   Date Value Ref Range Status   02/20/2025 101 40 - 150 U/L Final     AST   Date Value Ref Range Status   02/20/2025 45 (H) 10 - 40 U/L Final     ALT   Date Value Ref Range Status   02/20/2025 29 10 - 44 U/L Final     Anion Gap   Date Value Ref Range Status   06/13/2025 9 8 - 16 mmol/L Final     eGFR if    Date Value Ref Range Status   07/26/2021 >60 >60 mL/min/1.73 m^2 Final     eGFR if non    Date Value Ref Range Status   07/26/2021 >60 >60 mL/min/1.73 m^2 Final     Comment:     Calculation used to obtain the estimated glomerular filtration  rate (eGFR) is the CKD-EPI equation.        Lab Results   Component Value Date    WBC 7.16 02/20/2025    HGB 13.3 (L) 02/20/2025    HCT 40.2 02/20/2025     (H) 02/20/2025     02/20/2025     Lab Results   Component Value Date    CHOL 157 02/20/2025    CHOL 130 02/23/2023    CHOL 165 01/15/2014     Lab Results   Component Value Date    HDL 56 02/20/2025    HDL 53 02/23/2023    HDL 57 01/15/2014      Lab Results   Component Value Date    LDLCALC 85.0 02/20/2025    LDLCALC 67.4 02/23/2023    LDLCALC 90 01/15/2014     Lab Results   Component Value Date    TRIG 80 02/20/2025    TRIG 48 02/23/2023    TRIG 90 01/15/2014     Lab Results   Component Value Date    CHOLHDL 35.7 02/20/2025    CHOLHDL 40.8 02/23/2023    CHOLHDL 2.9 01/15/2014     Lab Results   Component Value Date    TSH 26.300 (H) 06/13/2025       ASSESSMENT     1. Hypothyroidism, unspecified type    2. Atrial fibrillation, unspecified type    3. Congestive heart failure, unspecified HF chronicity, unspecified heart failure type    4. Chronic kidney disease, stage 3a    5. Injury of left lower extremity, initial encounter           PLAN  Hypothyroidism- rechecked TSH today we will also check CBC and CMP.  Afib- on Eliquis amiodarone and Toprol.  Management per Cardiology and electrophysiology.  CHF-euvolemic on exam.  Lungs are clear.  He is on spironolactone and Lasix.  These were decreased in frequency by his cardiologist he states.  Ckd3a- stable.  On AceI.  Avoid nephrotoxic mg we will recheck CMP  Leg looks to be improving.  Has a couple days of doxycycline left.  Continue wound care and finish out doxycycline and mupirocin as prescribed.    Keep follow up as scheduled with PCP and specialists     1. Atrial fibrillation, unspecified type  - CBC Auto Differential; Future  - Comprehensive Metabolic Panel; Future  - TSH; Future  - T4; Future    2. Congestive heart failure, unspecified HF chronicity, unspecified heart failure type  - CBC Auto Differential; Future  - Comprehensive Metabolic Panel; Future  - TSH; Future  - T4; Future    3. Hypothyroidism, unspecified type  - CBC Auto Differential; Future  - Comprehensive Metabolic Panel; Future  - TSH; Future  - T4; Future    4. Chronic kidney disease, stage 3a    5. Injury of left lower extremity, initial encounter       Patient's plan/treatment was discussed including medications and possible side  effects. Verbalized understanding of all instructions.     This note was partly generated with [email or web address] voice recognition software. I apologize for any possible typographical errors.          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    Nonrheumatic aortic valve stenosis    Aortic valve stenosis    NYHA class 2 heart failure with preserved ejection fraction    S/P TAVR (transcatheter aortic valve replacement)    Hyperlipidemia    Atrial fibrillation    Unilateral primary osteoarthritis, right knee    Chronic kidney disease, stage 3a    Hypothyroidism   [2]   Current Outpatient Medications:     amiodarone (PACERONE) 200 MG Tab, Take 100 mg by mouth once daily., Disp: , Rfl:     apixaban (ELIQUIS) 5 mg Tab, Take 5 mg by mouth 2 (two) times daily., Disp: , Rfl:     aspirin (ECOTRIN) 81 MG EC tablet, Take 81 mg by mouth once daily.  , Disp: , Rfl:     atorvastatin (LIPITOR) 20 MG tablet, TAKE 1 TABLET DAILY, Disp: 90 tablet, Rfl: 4    cholecalciferol, vitamin D3, (VITAMIN D3) 50 mcg (2,000 unit) Tab, 1 tablet Orally Once a day, Disp: , Rfl:     doxycycline (VIBRAMYCIN) 100 MG Cap, Take 1 capsule (100 mg total) by mouth 2 (two) times daily. for 7 days, Disp: 14 capsule, Rfl: 0    furosemide (LASIX) 40 MG tablet, Take 40 mg by mouth. MWF only, Disp: , Rfl:     glucosamine-chondroitin (COSAMIN DS) 500-400 mg tablet, 1 tablet with a meal Orally Once a day for 30 day(s), Disp: , Rfl:     levothyroxine (SYNTHROID) 75 MCG tablet, Take 1 tablet (75 mcg total) by mouth before breakfast., Disp: 30 tablet, Rfl: 11    metoprolol succinate (TOPROL-XL) 25 MG 24 hr tablet, TAKE 1 TABLET DAILY, Disp: 90 tablet, Rfl: 3     MULTIVITAMIN W-MINERALS/LUTEIN (CENTRUM SILVER ORAL), Take by mouth once daily.  , Disp: , Rfl:     mupirocin (BACTROBAN) 2 % ointment, Apply topically 3 (three) times daily. Apply to wound infection, Disp: 22 g, Rfl: 0    ramipriL (ALTACE) 10 MG capsule, TAKE 1 CAPSULE DAILY, Disp: 90 capsule, Rfl: 3    spironolactone (ALDACTONE) 25 MG tablet, Take 25 mg by mouth. MWF only, Disp: , Rfl:

## 2025-07-29 ENCOUNTER — TELEPHONE (OUTPATIENT)
Dept: INTERNAL MEDICINE | Facility: CLINIC | Age: 85
End: 2025-07-29
Payer: MEDICARE

## 2025-07-29 DIAGNOSIS — E87.1 HYPONATREMIA: Primary | ICD-10-CM

## 2025-07-29 DIAGNOSIS — E03.9 HYPOTHYROIDISM, UNSPECIFIED TYPE: ICD-10-CM

## 2025-07-29 RX ORDER — LEVOTHYROXINE SODIUM 100 UG/1
100 TABLET ORAL
Qty: 30 TABLET | Refills: 11 | Status: SHIPPED | OUTPATIENT
Start: 2025-07-29 | End: 2026-07-29

## 2025-07-29 RX ORDER — LEVOTHYROXINE SODIUM 100 UG/1
100 TABLET ORAL
Qty: 30 TABLET | Refills: 11 | Status: SHIPPED | OUTPATIENT
Start: 2025-07-29 | End: 2025-07-29

## 2025-07-29 NOTE — TELEPHONE ENCOUNTER
Spoke with patient.  His TSH has improved but still pretty high.  Increase Synthroid to 100 mcg.  Stop 75 mcg.  Stable anemia.   Sodium down to 132 despite decreasing his frequency of his Lasix.  Now only taking 40 mg once a day on Monday Wednesday Friday only instead of b.i.d.   Could be secondary to hypothyroidism.  Needs to repeat his BMP in 1 week.  States he is going out of town he will do it when he returns after 10 days.  Discussed signs and symptoms of low-sodium if this occurs he needs to go the hospital.  Plan to recheck TSH in 6 weeks.  We will print out labs so that he can come and pick them up to bring to his cardiologist at [facility].     Geovanna, can you bring out cbc, cmp, tsh and leave at front for him?

## 2025-09-05 ENCOUNTER — IMMUNIZATION (OUTPATIENT)
Dept: INTERNAL MEDICINE | Facility: CLINIC | Age: 85
End: 2025-09-05
Payer: MEDICARE

## 2025-09-05 ENCOUNTER — OFFICE VISIT (OUTPATIENT)
Dept: INTERNAL MEDICINE | Facility: CLINIC | Age: 85
End: 2025-09-05
Payer: MEDICARE

## 2025-09-05 VITALS
DIASTOLIC BLOOD PRESSURE: 58 MMHG | HEIGHT: 71 IN | SYSTOLIC BLOOD PRESSURE: 126 MMHG | BODY MASS INDEX: 30 KG/M2 | HEART RATE: 67 BPM | WEIGHT: 214.31 LBS | OXYGEN SATURATION: 98 %

## 2025-09-05 DIAGNOSIS — E03.9 HYPOTHYROIDISM, UNSPECIFIED TYPE: Primary | ICD-10-CM

## 2025-09-05 DIAGNOSIS — E78.5 HYPERLIPIDEMIA, UNSPECIFIED HYPERLIPIDEMIA TYPE: ICD-10-CM

## 2025-09-05 DIAGNOSIS — Z23 NEED FOR VACCINATION: Primary | ICD-10-CM

## 2025-09-05 DIAGNOSIS — Z23 NEED FOR INFLUENZA VACCINATION: ICD-10-CM

## 2025-09-05 DIAGNOSIS — I48.91 ATRIAL FIBRILLATION, UNSPECIFIED TYPE: ICD-10-CM

## 2025-09-05 DIAGNOSIS — N18.31 CHRONIC KIDNEY DISEASE, STAGE 3A: ICD-10-CM

## 2025-09-05 DIAGNOSIS — I50.30 NYHA CLASS 2 HEART FAILURE WITH PRESERVED EJECTION FRACTION: ICD-10-CM

## 2025-09-05 DIAGNOSIS — I10 PRIMARY HYPERTENSION: ICD-10-CM

## 2025-09-05 PROCEDURE — 99999 PR PBB SHADOW E&M-EST. PATIENT-LVL IV: CPT | Mod: PBBFAC,,, | Performed by: STUDENT IN AN ORGANIZED HEALTH CARE EDUCATION/TRAINING PROGRAM

## 2025-09-05 RX ORDER — PANTOPRAZOLE SODIUM 40 MG/1
40 TABLET, DELAYED RELEASE ORAL EVERY MORNING
COMMUNITY
Start: 2025-08-19 | End: 2026-08-19

## (undated) DEVICE — COVER HD BACK TABLE 6FT

## (undated) DEVICE — PAD CAST SPECIALIST STRL 6

## (undated) DEVICE — SUT BONE WAX 2.5 GRMS 12/BX

## (undated) DEVICE — SUT VICRYL+ 1 CTX 18IN VIOL

## (undated) DEVICE — TRAY CORONARY CUSTOM BAPTIST

## (undated) DEVICE — UNDERGLOVES BIOGEL PI SIZE 8

## (undated) DEVICE — SOL NACL .9% 250ML INJ

## (undated) DEVICE — KIT TOTAL HIP OPTIVAC

## (undated) DEVICE — BLANKET HYPER ADULT 24X60IN

## (undated) DEVICE — GUIDEWIRE LAUREATE 035IN 260CM

## (undated) DEVICE — MANIFOLD PERCEPTOR MP 3P RH ON

## (undated) DEVICE — BANDAGE MATRIX HK LOOP 6IN 5YD

## (undated) DEVICE — CATH DXTERITY JL40 100CM 6FR

## (undated) DEVICE — CATH SWAN GANZ STND 7FR

## (undated) DEVICE — BAG DRAINAGE W/SPIKE

## (undated) DEVICE — SOL NACL IRR 3000ML

## (undated) DEVICE — ALCOHOL ISOPROPYL BLU 70% 16OZ

## (undated) DEVICE — SUT STRATAFIX PDS 1 CTX 18IN

## (undated) DEVICE — TOWEL OR DISP STRL BLUE 4/PK

## (undated) DEVICE — UNDERGLOVES BIOGEL PI SIZE 8.5

## (undated) DEVICE — Device

## (undated) DEVICE — BNDG COFLEX FOAM LF2 ST 6X5YD

## (undated) DEVICE — TOURNIQUET SB QC DP 34X4IN

## (undated) DEVICE — TIP YANKAUERS BULB NO VENT

## (undated) DEVICE — ELECTRODE REM PLYHSV RETURN 9

## (undated) DEVICE — SUT ETHIBOND EXCEL 2 V37 30

## (undated) DEVICE — GLOVE BIOGEL SKINSENSE PI 7.5

## (undated) DEVICE — DRAPE STERI INSTRUMENT 1018

## (undated) DEVICE — DRESSING XEROFORM NONADH 1X8IN

## (undated) DEVICE — OMNIPAQUE 350MG 150ML VIAL

## (undated) DEVICE — BLADE REMANER PATELLA 41MM

## (undated) DEVICE — BLADE SURG STAINLESS STEEL #11

## (undated) DEVICE — TRAY CATH 1-LYR URIMTR 16FR

## (undated) DEVICE — CONTAINER SPEC OR STRL 4.5OZ

## (undated) DEVICE — GUIDEWIRE 145CM .035

## (undated) DEVICE — GLOVE BIOGEL SKINSENSE PI 7.0

## (undated) DEVICE — GUIDEWIRE .021X260CM J-3MM TIP

## (undated) DEVICE — STAPLER SKIN PROXIMATE WIDE

## (undated) DEVICE — PULSAVAC ZIMMER

## (undated) DEVICE — INTRODUCER CATH 7F 11CML

## (undated) DEVICE — PRESSURIZER BN CEMENT NOZZLE

## (undated) DEVICE — PAD COLD THERAPY KNEE WRAP ON

## (undated) DEVICE — SPONGE LAP 18X18 PREWASHED

## (undated) DEVICE — SOL IRR SOD CHL .9% POUR

## (undated) DEVICE — SEALER BIPOLAR TISSUE 6.0

## (undated) DEVICE — APPLICATOR CHLORAPREP ORN 26ML

## (undated) DEVICE — BANDAGE ESMARK ELASTIC ST 6X9

## (undated) DEVICE — NDL 21GA X1 1/2 REG BEVEL

## (undated) DEVICE — UNDERPAD ULTRASORB 300LB 30X36

## (undated) DEVICE — CATH DXTERITY PIGSTR 110CM 6FR

## (undated) DEVICE — CATH DXTERITY AL20 100CM 6FR

## (undated) DEVICE — HOOD T7 W/ PEEL AWAY LENS

## (undated) DEVICE — SYR 10ML LIDOCAINE

## (undated) DEVICE — CATH DXTERITY JR40 100CM 6FR

## (undated) DEVICE — SUT VICRYL PLUS 2-0 CT1 18

## (undated) DEVICE — GLOVE BIOGEL SKINSENSE PI 8.5

## (undated) DEVICE — SPONGE COTTON TRAY 4X4IN

## (undated) DEVICE — STOPCOCK 3 WAY MED PRESSURE

## (undated) DEVICE — DRAPE EXTREMITY ORTHOMAX

## (undated) DEVICE — SYR 0.9% NACL 10ML STERILE

## (undated) DEVICE — BLADE SAG DUAL 18MMX1.27MMX90M

## (undated) DEVICE — UNDERGLOVES BIOGEL PI SZ 7 LF

## (undated) DEVICE — SYR CNTRL MALE LL 10ML